# Patient Record
Sex: FEMALE | Race: WHITE | NOT HISPANIC OR LATINO | Employment: OTHER | URBAN - METROPOLITAN AREA
[De-identification: names, ages, dates, MRNs, and addresses within clinical notes are randomized per-mention and may not be internally consistent; named-entity substitution may affect disease eponyms.]

---

## 2017-09-14 ENCOUNTER — TRANSCRIBE ORDERS (OUTPATIENT)
Dept: ADMINISTRATIVE | Facility: HOSPITAL | Age: 62
End: 2017-09-14

## 2017-09-14 DIAGNOSIS — Z12.31 VISIT FOR SCREENING MAMMOGRAM: Primary | ICD-10-CM

## 2017-09-21 ENCOUNTER — OFFICE VISIT (OUTPATIENT)
Dept: OBSTETRICS AND GYNECOLOGY | Facility: CLINIC | Age: 62
End: 2017-09-21

## 2017-09-21 ENCOUNTER — HOSPITAL ENCOUNTER (OUTPATIENT)
Dept: BONE DENSITY | Facility: HOSPITAL | Age: 62
Discharge: HOME | End: 2017-09-21
Attending: OBSTETRICS & GYNECOLOGY | Admitting: OBSTETRICS & GYNECOLOGY

## 2017-09-21 VITALS
HEIGHT: 63 IN | DIASTOLIC BLOOD PRESSURE: 76 MMHG | WEIGHT: 229.8 LBS | BODY MASS INDEX: 40.72 KG/M2 | SYSTOLIC BLOOD PRESSURE: 120 MMHG

## 2017-09-21 DIAGNOSIS — Z78.0 MENOPAUSE: Primary | ICD-10-CM

## 2017-09-21 DIAGNOSIS — N95.2 VAGINAL ATROPHY: ICD-10-CM

## 2017-09-21 DIAGNOSIS — J45.41 MODERATE PERSISTENT ASTHMA WITH ACUTE EXACERBATION: ICD-10-CM

## 2017-09-21 DIAGNOSIS — Z78.0 MENOPAUSE: ICD-10-CM

## 2017-09-21 DIAGNOSIS — D25.2 SUBSEROSAL LEIOMYOMA OF UTERUS: ICD-10-CM

## 2017-09-21 DIAGNOSIS — N76.3 CHRONIC VULVITIS: ICD-10-CM

## 2017-09-21 DIAGNOSIS — J45.51 SEVERE PERSISTENT ASTHMA WITH ACUTE EXACERBATION: ICD-10-CM

## 2017-09-21 PROCEDURE — 99214 OFFICE O/P EST MOD 30 MIN: CPT | Performed by: OBSTETRICS & GYNECOLOGY

## 2017-09-21 PROCEDURE — 77080 DXA BONE DENSITY AXIAL: CPT

## 2017-09-21 RX ORDER — LISINOPRIL 10 MG/1
1 TABLET ORAL DAILY
COMMUNITY
Start: 2017-08-29 | End: 2017-10-03

## 2017-09-21 RX ORDER — ALBUTEROL SULFATE 90 UG/1
2 AEROSOL, METERED RESPIRATORY (INHALATION) AS NEEDED
COMMUNITY
Start: 2017-09-13 | End: 2019-01-31 | Stop reason: SDUPTHER

## 2017-09-21 RX ORDER — ESTRADIOL 0.1 MG/G
0.5 CREAM VAGINAL 2 TIMES WEEKLY
Qty: 42.5 G | Refills: 3 | Status: SHIPPED | OUTPATIENT
Start: 2017-09-21 | End: 2018-09-25 | Stop reason: ALTCHOICE

## 2017-09-21 RX ORDER — VENLAFAXINE HYDROCHLORIDE 150 MG/1
1 CAPSULE, EXTENDED RELEASE ORAL DAILY
COMMUNITY
Start: 2017-09-13 | End: 2021-10-11

## 2017-09-21 NOTE — PROGRESS NOTES
Chief Complaint   Patient presents with   • Gynecologic Exam     c/o vulvar itching   • Med Refill       Adore Brito is a 62 y.o. year old  presenting to be seen because of complaints of vaginal dryness, vulvar irritation, and marked increase in wheezing and shortness of breath.  She uses Estrace vaginal cream, 0.5 g twice a week.  She has developed irritation and itching of the vulva and groin area over the past few months.  She has used 1% hydrocortisone cream without relief.  She has noted an increase in cough, wheezing, and shortness of breath over the past year.  She has not been evaluated for this.  She has a history of uterine leiomyomata.  On a pelvic ultrasound from 2016 her uterus was 86 cm³.  She had 2 subserous and 2 intramural uterine leiomyomata present.  She is scheduled for a follow-up ultrasound today.    History   Sexual Activity   • Sexual activity: No     SCREENING TESTS    Year 2012   Age                         PAP                         HPV high risk                         Mammogram     benign                    MOSHE score                         Breast MRI                         Lipids                         Vitamin D                         Colonoscopy                         DEXA  Frax (hip/any)                         Ovarian Screen                             No Additional Complaints Reported    The following portions of the patient's history were reviewed and updated as appropriate:vital signs and   She  does not have any pertinent problems on file.  She  has a past surgical history that includes Tonsillectomy and Tracheostomy tube placement.  Her family history includes Deep vein thrombosis in her mother and sister; Diabetes in her mother; Hypertension in her father, maternal grandfather, maternal grandmother, mother, paternal grandfather, and paternal  "grandmother; Leukemia in her paternal grandfather; Stomach cancer in her maternal grandfather; Throat cancer in her father.  She  reports that she has never smoked. She has never used smokeless tobacco. She reports that she drinks alcohol. She reports that she does not use illicit drugs.  Current Outpatient Prescriptions   Medication Sig Dispense Refill   • BOOSTRIX 5-2.5-18.5 injection      • estradiol (ESTRACE) 0.1 MG/GM vaginal cream Insert 0.5 g into the vagina 2 (Two) Times a Week. 42.5 g 3   • hydrocortisone 2.5 % cream Apply a thin coat to the vulva daily 45 g 3   • lisinopril (PRINIVIL,ZESTRIL) 10 MG tablet Take 1 tablet by mouth Daily.     • temazepam (RESTORIL) 30 MG capsule Take 1 capsule by mouth as needed.     • venlafaxine XR (EFFEXOR-XR) 150 MG 24 hr capsule Take 1 capsule by mouth Daily.     • VENTOLIN  (90 Base) MCG/ACT inhaler Inhale 2 puffs As Needed.       No current facility-administered medications for this visit.      She is allergic to codeine..        Review of Systems  A comprehensive review of systems was done.  Constitutional: negative for fever, chills, activity change, appetite change, fatigue and unexpected weight change.  Respiratory: positive for cough, dyspnea on exertion and wheezing  Cardiovascular: negative  Gastrointestinal: negative  Genitourinary:negative  Musculoskeletal:positive for back pain and stiff joints  Behavioral/Psych: negative          /76  Ht 62.5\" (158.8 cm)  Wt 229 lb 12.8 oz (104 kg)  LMP  (LMP Unknown)  BMI 41.36 kg/m2    Physical Exam    General:  alert; cooperative; well developed; well nourished  obese - Body mass index is 41.36 kg/(m^2).   Skin:  No suspicious lesions seen   Thyroid: normal to inspection and palpation   Lungs:  wheezes bilaterally   Heart:  regular rate and rhythm, S1, S2 normal, no murmur, click, rub or gallop   Breasts:  Examined in supine position  Symmetric without masses or skin dimpling  Nipples normal without " inversion, lesions or discharge  There are no palpable axillary nodes   Abdomen: soft, non-tender; no masses  no umbilical or inginual hernias are present  no hepato-splenomegaly  obese   Pelvis: Clinical staff was present for exam  External genitalia:  erythema of the vulva and groin areas without lesions  Vaginal:  normal pink mucosa without prolapse or lesions.  Cervix:  normal appearance.  Uterus:  retroverted; asymmetrically enlarged, consistent with 6 weeks size;  Adnexa:  non palpable bilaterally.  Rectal:  anus visually normal appearing. recto-vaginal exam unremarkable and confirms findings;     Lab Review   No data reviewed    Imaging   Mammogram results- benign    Medical counseling was given to patient for the following topics: diagnostic results, instructions for management, risk factor reductions, impressions, risks and benefits of treatment options and importance of treatment compliance . Total time of the encounter was 28 minute(s) and 19 minute(s)  was spent in face to face counseling.  I have counseled this patient that she needs to continue using Estrace cream twice a week.  I have counseled the patient that she needs to use hydrocortisone cream, 2.5% to the vulva and groin areas daily to treat chronic vulvitis.  I counseled the patient that she needs follow-up for her asthma since her coughing and wheezing has significantly increased.  I counseled the patient that she needs a pelvic ultrasound to evaluate her irregular, enlarged uterus.  I have counseled the patient in monthly self breast assessment and annual breast imaging.  I have encouraged weight reduction and exercise.          ASSESSMENT  Problems Addressed this Visit        Genitourinary    Menopause - Primary    Relevant Orders    DEXA Bone Density Axial    Vaginal atrophy    Relevant Medications    estradiol (ESTRACE) 0.1 MG/GM vaginal cream       Other    Subserosal leiomyoma of uterus      Other Visit Diagnoses     Moderate persistent  asthma with acute exacerbation        Relevant Medications    VENTOLIN  (90 Base) MCG/ACT inhaler    Chronic vulvitis        Relevant Medications    hydrocortisone 2.5 % cream            PLAN    Medications prescribed this encounter:    New Medications Ordered This Visit   Medications   • lisinopril (PRINIVIL,ZESTRIL) 10 MG tablet     Sig: Take 1 tablet by mouth Daily.   • VENTOLIN  (90 Base) MCG/ACT inhaler     Sig: Inhale 2 puffs As Needed.   • BOOSTRIX 5-2.5-18.5 injection   • venlafaxine XR (EFFEXOR-XR) 150 MG 24 hr capsule     Sig: Take 1 capsule by mouth Daily.   • estradiol (ESTRACE) 0.1 MG/GM vaginal cream     Sig: Insert 0.5 g into the vagina 2 (Two) Times a Week.     Dispense:  42.5 g     Refill:  3   • hydrocortisone 2.5 % cream     Sig: Apply a thin coat to the vulva daily     Dispense:  45 g     Refill:  3   · Pelvic ultrasound scan-The pelvic ultrasound reveals 2 subserous and 2 intramural uterine leiomyomata which are stable.  The overall uterine size is stable.  The endometrial stripe is 3.2 mm.  The ovaries are not enlarged.  I have discussed these findings with the patient and she desires to be observed.  She is having no postmenopausal bleeding.  · Pulmonary consultation to evaluate Asthma and related symptoms  · Follow up: 12 month(s)  · Calcium, 600 mg/ Vit. D, 400 IU daily     *Please note that portions of this documentation may have been completed with a voice recognition program.  Efforts were made to edit this dictation, but occasional words may have been mistranscribed.     This note was electronically signed.    KISHA Melgar MD  September 21, 2017  11:13 AM

## 2017-09-21 NOTE — PATIENT INSTRUCTIONS
Calorie Counting for Weight Loss  Calories are energy you get from the things you eat and drink. Your body uses this energy to keep you going throughout the day. The number of calories you eat affects your weight. When you eat more calories than your body needs, your body stores the extra calories as fat. When you eat fewer calories than your body needs, your body burns fat to get the energy it needs.  Calorie counting means keeping track of how many calories you eat and drink each day. If you make sure to eat fewer calories than your body needs, you should lose weight. In order for calorie counting to work, you will need to eat the number of calories that are right for you in a day to lose a healthy amount of weight per week. A healthy amount of weight to lose per week is usually 1-2 lb (0.5-0.9 kg). A dietitian can determine how many calories you need in a day and give you suggestions on how to reach your calorie goal.   WHAT IS MY MY PLAN?  My goal is to have __________ calories per day.   If I have this many calories per day, I should lose around __________ pounds per week.  WHAT DO I NEED TO KNOW ABOUT CALORIE COUNTING?  In order to meet your daily calorie goal, you will need to:  · Find out how many calories are in each food you would like to eat. Try to do this before you eat.  · Decide how much of the food you can eat.  · Write down what you ate and how many calories it had. Doing this is called keeping a food log.  WHERE DO I FIND CALORIE INFORMATION?  The number of calories in a food can be found on a Nutrition Facts label. Note that all the information on a label is based on a specific serving of the food. If a food does not have a Nutrition Facts label, try to look up the calories online or ask your dietitian for help.  HOW DO I DECIDE HOW MUCH TO EAT?  To decide how much of the food you can eat, you will need to consider both the number of calories in one serving and the size of one serving. This  information can be found on the Nutrition Facts label. If a food does not have a Nutrition Facts label, look up the information online or ask your dietitian for help.  Remember that calories are listed per serving. If you choose to have more than one serving of a food, you will have to multiply the calories per serving by the amount of servings you plan to eat. For example, the label on a package of bread might say that a serving size is 1 slice and that there are 90 calories in a serving. If you eat 1 slice, you will have eaten 90 calories. If you eat 2 slices, you will have eaten 180 calories.  HOW DO I KEEP A FOOD LOG?  After each meal, record the following information in your food log:  · What you ate.  · How much of it you ate.  · How many calories it had.  · Then, add up your calories.  Keep your food log near you, such as in a small notebook in your pocket. Another option is to use a mobile jeremi or website. Some programs will calculate calories for you and show you how many calories you have left each time you add an item to the log.  WHAT ARE SOME CALORIE COUNTING TIPS?  · Use your calories on foods and drinks that will fill you up and not leave you hungry. Some examples of this include foods like nuts and nut butters, vegetables, lean proteins, and high-fiber foods (more than 5 g fiber per serving).  · Eat nutritious foods and avoid empty calories. Empty calories are calories you get from foods or beverages that do not have many nutrients, such as candy and soda. It is better to have a nutritious high-calorie food (such as an avocado) than a food with few nutrients (such as a bag of chips).  · Know how many calories are in the foods you eat most often. This way, you do not have to look up how many calories they have each time you eat them.  · Look out for foods that may seem like low-calorie foods but are really high-calorie foods, such as baked goods, soda, and fat-free candy.  · Pay attention to calories  in drinks. Drinks such as sodas, specialty coffee drinks, alcohol, and juices have a lot of calories yet do not fill you up. Choose low-calorie drinks like water and diet drinks.  · Focus your calorie counting efforts on higher calorie items. Logging the calories in a garden salad that contains only vegetables is less important than calculating the calories in a milk shake.  · Find a way of tracking calories that works for you. Get creative. Most people who are successful find ways to keep track of how much they eat in a day, even if they do not count every calorie.  WHAT ARE SOME PORTION CONTROL TIPS?  · Know how many calories are in a serving. This will help you know how many servings of a certain food you can have.  · Use a measuring cup to measure serving sizes. This is helpful when you start out. With time, you will be able to estimate serving sizes for some foods.  · Take some time to put servings of different foods on your favorite plates, bowls, and cups so you know what a serving looks like.  · Try not to eat straight from a bag or box. Doing this can lead to overeating. Put the amount you would like to eat in a cup or on a plate to make sure you are eating the right portion.  · Use smaller plates, glasses, and bowls to prevent overeating. This is a quick and easy way to practice portion control. If your plate is smaller, less food can fit on it.  · Try not to multitask while eating, such as watching TV or using your computer. If it is time to eat, sit down at a table and enjoy your food. Doing this will help you to start recognizing when you are full. It will also make you more aware of what and how much you are eating.  HOW CAN I CALORIE COUNT WHEN EATING OUT?  · Ask for smaller portion sizes or child-sized portions.  · Consider sharing an entree and sides instead of getting your own entree.  · If you get your own entree, eat only half. Ask for a box at the beginning of your meal and put the rest of your  "entree in it so you are not tempted to eat it.  · Look for the calories on the menu. If calories are listed, choose the lower calorie options.  · Choose dishes that include vegetables, fruits, whole grains, low-fat dairy products, and lean protein. Focusing on smart food choices from each of the 5 food groups can help you stay on track at restaurants.  · Choose items that are boiled, broiled, grilled, or steamed.  · Choose water, milk, unsweetened iced tea, or other drinks without added sugars. If you want an alcoholic beverage, choose a lower calorie option. For example, a regular jazmyne can have up to 700 calories and a glass of wine has around 150.  · Stay away from items that are buttered, battered, fried, or served with cream sauce. Items labeled \"crispy\" are usually fried, unless stated otherwise.  · Ask for dressings, sauces, and syrups on the side. These are usually very high in calories, so do not eat much of them.  · Watch out for salads. Many people think salads are a healthy option, but this is often not the case. Many salads come with bell, fried chicken, lots of cheese, fried chips, and dressing. All of these items have a lot of calories. If you want a salad, choose a garden salad and ask for grilled meats or steak. Ask for the dressing on the side, or ask for olive oil and vinegar or lemon to use as dressing.  · Estimate how many servings of a food you are given. For example, a serving of cooked rice is ½ cup or about the size of half a tennis ball or one cupcake wrapper. Knowing serving sizes will help you be aware of how much food you are eating at restaurants. The list below tells you how big or small some common portion sizes are based on everyday objects.    1 oz--4 stacked dice.    3 oz--1 deck of cards.    1 tsp--1 dice.    1 Tbsp--½ a Ping-Pong ball.    2 Tbsp--1 Ping-Pong ball.    ½ cup--1 tennis ball or 1 cupcake wrapper.    1 cup--1 baseball.     This information is not intended to " replace advice given to you by your health care provider. Make sure you discuss any questions you have with your health care provider.     Document Released: 12/18/2006 Document Revised: 01/08/2016 Document Reviewed: 10/23/2014  [employer] Interactive Patient Education ©2017 [employer]

## 2017-09-26 DIAGNOSIS — D39.10 NEOPLASM OF UNCERTAIN BEHAVIOR OF OVARY, UNSPECIFIED LATERALITY: ICD-10-CM

## 2017-09-26 DIAGNOSIS — N83.209 CYST OF OVARY, UNSPECIFIED LATERALITY: Primary | ICD-10-CM

## 2017-09-28 ENCOUNTER — LAB (OUTPATIENT)
Dept: LAB | Facility: HOSPITAL | Age: 62
End: 2017-09-28

## 2017-09-28 DIAGNOSIS — D39.10 NEOPLASM OF UNCERTAIN BEHAVIOR OF OVARY, UNSPECIFIED LATERALITY: ICD-10-CM

## 2017-09-28 LAB — CANCER AG125 SERPL QL: 16.2 U/ML (ref 0–30.2)

## 2017-09-28 PROCEDURE — 86304 IMMUNOASSAY TUMOR CA 125: CPT | Performed by: OBSTETRICS & GYNECOLOGY

## 2017-09-28 PROCEDURE — 36415 COLL VENOUS BLD VENIPUNCTURE: CPT

## 2017-10-03 ENCOUNTER — OFFICE VISIT (OUTPATIENT)
Dept: PULMONOLOGY | Facility: CLINIC | Age: 62
End: 2017-10-03

## 2017-10-03 VITALS
SYSTOLIC BLOOD PRESSURE: 122 MMHG | BODY MASS INDEX: 42.51 KG/M2 | HEIGHT: 62 IN | RESPIRATION RATE: 16 BRPM | OXYGEN SATURATION: 95 % | HEART RATE: 76 BPM | DIASTOLIC BLOOD PRESSURE: 77 MMHG | TEMPERATURE: 98 F | WEIGHT: 231 LBS

## 2017-10-03 DIAGNOSIS — J30.2 CHRONIC SEASONAL ALLERGIC RHINITIS, UNSPECIFIED TRIGGER: ICD-10-CM

## 2017-10-03 DIAGNOSIS — J45.40 MODERATE PERSISTENT ASTHMA WITHOUT COMPLICATION: ICD-10-CM

## 2017-10-03 DIAGNOSIS — K21.9 GASTROESOPHAGEAL REFLUX DISEASE, ESOPHAGITIS PRESENCE NOT SPECIFIED: ICD-10-CM

## 2017-10-03 DIAGNOSIS — R06.02 SHORTNESS OF BREATH: Primary | ICD-10-CM

## 2017-10-03 PROCEDURE — 94729 DIFFUSING CAPACITY: CPT | Performed by: INTERNAL MEDICINE

## 2017-10-03 PROCEDURE — 94726 PLETHYSMOGRAPHY LUNG VOLUMES: CPT | Performed by: INTERNAL MEDICINE

## 2017-10-03 PROCEDURE — 94375 RESPIRATORY FLOW VOLUME LOOP: CPT | Performed by: INTERNAL MEDICINE

## 2017-10-03 PROCEDURE — 99205 OFFICE O/P NEW HI 60 MIN: CPT | Performed by: INTERNAL MEDICINE

## 2017-10-03 RX ORDER — OMEPRAZOLE 40 MG/1
40 CAPSULE, DELAYED RELEASE ORAL DAILY
Qty: 30 CAPSULE | Refills: 3 | Status: SHIPPED | OUTPATIENT
Start: 2017-10-03

## 2017-10-03 RX ORDER — BUDESONIDE AND FORMOTEROL FUMARATE DIHYDRATE 160; 4.5 UG/1; UG/1
2 AEROSOL RESPIRATORY (INHALATION) 2 TIMES DAILY
Qty: 1 INHALER | Refills: 11 | Status: SHIPPED | OUTPATIENT
Start: 2017-10-03 | End: 2018-01-25 | Stop reason: SDUPTHER

## 2017-10-03 RX ORDER — FLUTICASONE PROPIONATE 50 MCG
2 SPRAY, SUSPENSION (ML) NASAL 2 TIMES DAILY
Qty: 1 BOTTLE | Refills: 11 | Status: SHIPPED | OUTPATIENT
Start: 2017-10-03 | End: 2017-10-26

## 2017-10-03 NOTE — PROGRESS NOTES
CC:    Asthma    HPI:    Ms. Brito is a 62-year-old white female with a lifetime history of asthma as well as questionable hypertension, depression/anxiety, lifetime nonsmoker, who was referred to me for evaluation of asthma.  As a child, when the patient was 5 years old, she had bronchitis, pneumonia, and croup, and actually required a tracheostomy.  She was subsequently cannulated.  For the past 2-3 years, patient has noticed increased chest tightness and wheezing, particularly in the summer and fall, when her allergies get worse, in addition to the spring.  She has had a dry cough which is worse at night.  She also complains of scratchy throat and acid reflux, again which is particularly worse at night.  She used to be on a PPI and has had an EGD in the past.  She has almost daily reflux for the past 10-15 years and takes Tums as needed.  She reports increased chest tightness and wheezing, particularly when going outside and trying to work in the yard.  Her symptoms are relieved by a rescue inhaler.  She is not currently on any controller medication and has never been on a controller medication.  Patient has no chest pain, hemoptysis, weight loss, night sweats, etc.                    PMH:    Asthma  Allergic Rhinitis  Past Medical History:   Diagnosis Date   • Anxiety    • Depression    • Fibroids     intramural, subserosal   • GERD (gastroesophageal reflux disease)    • History of panic attacks    • Hypertension    • Menopause      PSH:    Past Surgical History:   Procedure Laterality Date   • TONSILLECTOMY     • TRACHEOSTOMY       FH:    Family History   Problem Relation Age of Onset   • Hypertension Father    • Throat cancer Father    • Hypertension Mother    • Deep vein thrombosis Mother    • Diabetes Mother    • Deep vein thrombosis Sister    • Hypertension Paternal Grandfather    • Leukemia Paternal Grandfather    • Hypertension Paternal Grandmother    • Hypertension Maternal Grandmother    • Hypertension  Maternal Grandfather    • Stomach cancer Maternal Grandfather      SH:    Social History     Social History   • Marital status:      Spouse name: N/A   • Number of children: N/A   • Years of education: N/A     Occupational History   • Not on file.     Social History Main Topics   • Smoking status: Never Smoker   • Smokeless tobacco: Never Used   • Alcohol use Yes      Comment: rarely   • Drug use: No   • Sexual activity: No     Other Topics Concern   • Not on file     Social History Narrative     ALLERGIES:    Allergies   Allergen Reactions   • Codeine Nausea And Vomiting     MEDICATIONS:      Current Outpatient Prescriptions:   •  estradiol (ESTRACE) 0.1 MG/GM vaginal cream, Insert 0.5 g into the vagina 2 (Two) Times a Week., Disp: 42.5 g, Rfl: 3  •  hydrocortisone 2.5 % cream, Apply a thin coat to the vulva daily, Disp: 45 g, Rfl: 3  •  temazepam (RESTORIL) 30 MG capsule, Take 1 capsule by mouth as needed., Disp: , Rfl:   •  venlafaxine XR (EFFEXOR-XR) 150 MG 24 hr capsule, Take 1 capsule by mouth Daily., Disp: , Rfl:   •  VENTOLIN  (90 Base) MCG/ACT inhaler, Inhale 2 puffs As Needed., Disp: , Rfl:   ROS:  Per HPI, otherwise all systems reviewed and negative.    DIAGNOSTIC DATA (Reviewed and interpreted by me unless otherwise specified):    CXR 10/2/17 - no acute lung disease    PFT - 10/3/17 - mild restriction, low erv, normal DL that over-corrects c/w obesity (BMI 42)    Vitals:    10/03/17 1533   BP: 122/77   Pulse: 76   Resp: 16   Temp: 98 °F (36.7 °C)   SpO2: 95%       Physical Exam   Constitutional: Oriented to person, place, and time. Appears well-developed and well-nourished.   Head: Normocephalic and atraumatic.   Nose: Nose normal.   Mouth/Throat: Oropharynx is clear and moist.   Eyes: Conjunctivae are normal.   Neck: No tracheal deviation present.   Cardiovascular: Normal rate, regular rhythm, normal heart sounds and intact distal pulses.  Exam reveals no gallop and no friction rub.  No  thrill.  No JVD.  No edema.  No murmur heard.  Pulmonary/Chest: Effort normal and breath sounds w/ mild wheezing.  No tenderness to palpation.  No clubbing.   Abdominal: Soft. Bowel sounds are normal. No distension. No tenderness. There is no guarding.   Musculoskeletal: Normal range of motion.  No tenderness.  Lymphadenopathy:  No cervical adenopathy.   Neurological:  No new focal neurological deficits observed   Skin: Skin is warm and dry. No rash noted.   Psychiatric: Normal mood and affect.  Behavior is normal. Judgment normal.    Assessment/Plan     1)  Moderate Persistent Asthma - start symbicort 160 w/ spacer + prn albuterol.  If unable to afford it we can do pulmicort and formoterol neb bid.    2)  Allergic Rhinitis - flonase 2 sprays q nostril bid.  If not improved next visit we can step up therapy.    3)  GERD - probably contributing to asthma.  Start prilosec 20 daily.  If not improved, GI consult for possible EGD given long-standing symptoms.    RTC 6 weeks    David Danielle MD  Pulmonology and Critical Care Medicine  10/03/17 5:10 PM  Electronically Signed    C.C.:  Josesito Melgar MD, KISHA Melgar MD

## 2017-10-11 ENCOUNTER — HOSPITAL ENCOUNTER (OUTPATIENT)
Dept: MAMMOGRAPHY | Facility: HOSPITAL | Age: 62
Discharge: HOME | End: 2017-10-11
Attending: OBSTETRICS & GYNECOLOGY | Admitting: OBSTETRICS & GYNECOLOGY

## 2017-10-11 DIAGNOSIS — Z12.31 VISIT FOR SCREENING MAMMOGRAM: ICD-10-CM

## 2017-10-11 PROCEDURE — 77063 BREAST TOMOSYNTHESIS BI: CPT

## 2017-10-11 PROCEDURE — G0202 SCR MAMMO BI INCL CAD: HCPCS

## 2017-10-12 PROCEDURE — 77063 BREAST TOMOSYNTHESIS BI: CPT | Performed by: RADIOLOGY

## 2017-10-12 PROCEDURE — G0202 SCR MAMMO BI INCL CAD: HCPCS | Performed by: RADIOLOGY

## 2017-10-26 ENCOUNTER — OFFICE VISIT (OUTPATIENT)
Dept: PULMONOLOGY | Facility: CLINIC | Age: 62
End: 2017-10-26

## 2017-10-26 VITALS
TEMPERATURE: 97.6 F | HEIGHT: 62 IN | DIASTOLIC BLOOD PRESSURE: 74 MMHG | WEIGHT: 232 LBS | RESPIRATION RATE: 16 BRPM | HEART RATE: 83 BPM | SYSTOLIC BLOOD PRESSURE: 130 MMHG | BODY MASS INDEX: 42.69 KG/M2 | OXYGEN SATURATION: 96 %

## 2017-10-26 DIAGNOSIS — K21.9 GASTROESOPHAGEAL REFLUX DISEASE, ESOPHAGITIS PRESENCE NOT SPECIFIED: ICD-10-CM

## 2017-10-26 DIAGNOSIS — J45.40 MODERATE PERSISTENT ASTHMA WITHOUT COMPLICATION: Primary | ICD-10-CM

## 2017-10-26 PROCEDURE — 99213 OFFICE O/P EST LOW 20 MIN: CPT | Performed by: NURSE PRACTITIONER

## 2017-10-26 NOTE — PROGRESS NOTES
[facility] Pulmonary Follow up    CHIEF COMPLAINT    Asthma    HISTORY OF PRESENT ILLNESS    Adore Brito is a 62 y.o.female here today for follow-up on her moderate persistent asthma.    She was evaluated by Dr. David Danielle as a new patient on 10/3/17 for asthma.  At that time she was not on any maintenance medication and was experiencing increased chest tightness and wheezing.  Her symptoms were relieved by a rescue inhaler.  He started her on Symbicort 160 with a spacer.  She reports significant improvement in her breathing.  She denies shortness of breath or wheezing.  She has only had to use her Ventolin twice in the past 4 weeks.  Unfortunately she is unable to afford Symbicort and has been getting by on samples.  She also voiced concerns over being on an inhaled corticosteroid long-term.    She has a history of reflux with symptoms almost daily for the past 10-15 years, previously treated by [facility] as needed.  She was started on Prilosec and her symptoms have been well controlled since.    Dr. Danielle also prescribed Flonase for allergic rhinitis.  She states that she could not afford this but that she also does not have much in the way of nasal congestion or postnasal drainage anyway.        Patient Active Problem List   Diagnosis   • Menopause   • Hypertension   • History of panic attacks   • GERD (gastroesophageal reflux disease)   • Depression   • Anxiety   • Vaginal atrophy   • Leiomyoma, intramural   • Subserosal leiomyoma of uterus   • Ovarian cyst, left   • Moderate persistent asthma without complication   • Chronic seasonal allergic rhinitis       Allergies   Allergen Reactions   • Codeine Nausea And Vomiting       Current Outpatient Prescriptions:   •  budesonide-formoterol (SYMBICORT) 160-4.5 MCG/ACT inhaler, Inhale 2 puffs 2 (Two) Times a Day., Disp: 1 inhaler, Rfl: 11  •  estradiol (ESTRACE) 0.1 MG/GM vaginal cream, Insert 0.5 g into the vagina 2 (Two) Times a Week., Disp: 42.5 g, Rfl: 3  •   "hydrocortisone 2.5 % cream, Apply a thin coat to the vulva daily, Disp: 45 g, Rfl: 3  •  omeprazole (priLOSEC) 40 MG capsule, Take 1 capsule by mouth Daily., Disp: 30 capsule, Rfl: 3  •  temazepam (RESTORIL) 30 MG capsule, Take 1 capsule by mouth as needed., Disp: , Rfl:   •  venlafaxine XR (EFFEXOR-XR) 150 MG 24 hr capsule, Take 1 capsule by mouth Daily., Disp: , Rfl:   •  VENTOLIN  (90 Base) MCG/ACT inhaler, Inhale 2 puffs As Needed., Disp: , Rfl:   MEDICATION LIST AND ALLERGIES REVIEWED.    Social History   Substance Use Topics   • Smoking status: Never Smoker   • Smokeless tobacco: Never Used   • Alcohol use Yes      Comment: rarely       FAMILY AND SOCIAL HISTORY REVIEWED.    Review of Systems   Constitutional: Negative for chills, fatigue and fever.   HENT: Negative for congestion, postnasal drip, rhinorrhea and sore throat.    Respiratory: Negative for cough, chest tightness, shortness of breath and wheezing.    Cardiovascular: Negative for chest pain and leg swelling.   .    /74  Pulse 83  Temp 97.6 °F (36.4 °C)  Resp 16  Ht 62\" (157.5 cm)  Wt 232 lb (105 kg)  LMP  (LMP Unknown)  SpO2 96% Comment: RA  BMI 42.43 kg/m2    Physical Exam   Constitutional: She is oriented to person, place, and time. She appears well-developed. No distress.   HENT:   Head: Normocephalic.   Neck: Neck supple.   Cardiovascular: Normal rate, regular rhythm and normal heart sounds.  Exam reveals no friction rub.    No murmur heard.  Pulmonary/Chest: Effort normal and breath sounds normal. No stridor. No respiratory distress. She has no wheezes. She has no rales.   Abdominal: Soft.   Musculoskeletal: Normal range of motion.   Neurological: She is alert and oriented to person, place, and time.   Skin: Skin is warm and dry.   Psychiatric: She has a normal mood and affect. Her behavior is normal.   Vitals reviewed.        RESULTS    PFTS in the office today, read by me:    PROBLEM LIST    Problem List Items Addressed " This Visit        Respiratory    Moderate persistent asthma without complication - Primary       Digestive    GERD (gastroesophageal reflux disease)            DISCUSSION    Moderate persistent asthma- continue Symbicort 160. She was given an additional sample and encouraged to call for samples in the future.  We discussed the possibility of switching to nebulized pulmicort and formoterol, however she is still concerned about cost.  She also expressed concern over long-term inhaled corticosteroid use.  She had dramatic improvement on Symbicort so we also discussed possibly stepping down from a combo ICS+LABA to just a LABA. We will work with her to find an affordable option that keeps her symptoms well controlled.  She indicated that she plans to begin exercising in an attempt at weight loss.  I encouraged increasing her activity level and also advised her to have her Ventolin on hand in the event of any exercise-induced bronchospasms.    GERD- symptoms well controlled with Prilosec.  Continue use.    Follow-up in 3 months.    Mohini Begum, APRN  10/26/973579:07 AM  Electronically signed     Please note that portions of this note were completed with a voice recognition program. Efforts were made to edit the dictations, but occasionally words are mistranscribed.      CC: KISHA Melgar MD

## 2018-01-25 ENCOUNTER — TELEPHONE (OUTPATIENT)
Dept: PULMONOLOGY | Facility: CLINIC | Age: 63
End: 2018-01-25

## 2018-01-25 RX ORDER — BUDESONIDE AND FORMOTEROL FUMARATE DIHYDRATE 160; 4.5 UG/1; UG/1
2 AEROSOL RESPIRATORY (INHALATION)
Qty: 1 INHALER | Refills: 11 | COMMUNITY
Start: 2018-01-25 | End: 2020-10-13 | Stop reason: ALTCHOICE

## 2018-01-25 NOTE — TELEPHONE ENCOUNTER
Pt LVM requesting samples of Rx Symbicort and Ventolin. Pt has an upcoming apt scheduled on 1/30/18 @ 12:30. Spoke with pt and advised her that once she comes in that her samples(1-Symbicort) will be sitting up front but we do not have samples of Ventolin at this time. Pt verbalized understanding and appreciation.

## 2018-01-30 ENCOUNTER — OFFICE VISIT (OUTPATIENT)
Dept: PULMONOLOGY | Facility: CLINIC | Age: 63
End: 2018-01-30

## 2018-01-30 VITALS
HEART RATE: 77 BPM | WEIGHT: 236.25 LBS | HEIGHT: 62 IN | BODY MASS INDEX: 43.47 KG/M2 | OXYGEN SATURATION: 95 % | RESPIRATION RATE: 18 BRPM | DIASTOLIC BLOOD PRESSURE: 102 MMHG | TEMPERATURE: 98.7 F | SYSTOLIC BLOOD PRESSURE: 132 MMHG

## 2018-01-30 DIAGNOSIS — K21.9 GASTROESOPHAGEAL REFLUX DISEASE, ESOPHAGITIS PRESENCE NOT SPECIFIED: ICD-10-CM

## 2018-01-30 DIAGNOSIS — J45.40 MODERATE PERSISTENT ASTHMA WITHOUT COMPLICATION: Primary | ICD-10-CM

## 2018-01-30 PROCEDURE — 99213 OFFICE O/P EST LOW 20 MIN: CPT | Performed by: NURSE PRACTITIONER

## 2018-01-30 RX ORDER — ALBUTEROL SULFATE 0.63 MG/3ML
1 SOLUTION RESPIRATORY (INHALATION) EVERY 6 HOURS PRN
Qty: 60 VIAL | Refills: 1 | COMMUNITY
Start: 2018-01-30 | End: 2020-10-13

## 2018-07-30 ENCOUNTER — OFFICE VISIT (OUTPATIENT)
Dept: PULMONOLOGY | Facility: CLINIC | Age: 63
End: 2018-07-30

## 2018-07-30 VITALS
HEIGHT: 62 IN | OXYGEN SATURATION: 97 % | HEART RATE: 70 BPM | DIASTOLIC BLOOD PRESSURE: 92 MMHG | SYSTOLIC BLOOD PRESSURE: 144 MMHG | BODY MASS INDEX: 43.47 KG/M2 | TEMPERATURE: 98.2 F | WEIGHT: 236.2 LBS | RESPIRATION RATE: 16 BRPM

## 2018-07-30 DIAGNOSIS — K21.9 GASTROESOPHAGEAL REFLUX DISEASE, ESOPHAGITIS PRESENCE NOT SPECIFIED: ICD-10-CM

## 2018-07-30 DIAGNOSIS — J45.40 MODERATE PERSISTENT ASTHMA WITHOUT COMPLICATION: Primary | ICD-10-CM

## 2018-07-30 PROCEDURE — 99213 OFFICE O/P EST LOW 20 MIN: CPT | Performed by: NURSE PRACTITIONER

## 2018-07-30 RX ORDER — LISINOPRIL 5 MG/1
TABLET ORAL DAILY
COMMUNITY
Start: 2018-06-18 | End: 2020-11-09 | Stop reason: DRUGHIGH

## 2018-07-30 NOTE — PROGRESS NOTES
[facility] Pulmonary Follow up    CHIEF COMPLAINT    Asthma    HISTORY OF PRESENT ILLNESS    Adore Brito is a 63 y.o.female lifetime nonsmoker here today for follow-up on moderate persistent asthma.    I last saw her in January.  She has done well with no acute illnesses or exacerbations since then.    She relies on samples for her inhalers.  She uses either Symbicort or Breo based on availability.  She reports that as long as she uses either one of these, she rarely has to use her Ventolin.  When she runs out of her maintenance inhaler though she does have to use Ventolin several times per day.    We set her up with a nebulizer and nebulized albuterol at her last visit, as this was more cost effective than Ventolin.  She has not had to use this though.    She has a history of reflux but this has been well controlled with use of omeprazole.    She also has a reported history of allergic rhinitis.  However she is not on any treatment for this and denies much in the way of nasal congestion or post nasal drainage.    Patient Active Problem List   Diagnosis   • Menopause   • Hypertension   • History of panic attacks   • GERD (gastroesophageal reflux disease)   • Depression   • Anxiety   • Vaginal atrophy   • Leiomyoma, intramural   • Subserosal leiomyoma of uterus   • Ovarian cyst, left   • Moderate persistent asthma without complication   • Chronic seasonal allergic rhinitis       Allergies   Allergen Reactions   • Codeine Nausea And Vomiting       Current Outpatient Prescriptions:   •  albuterol (ACCUNEB) 0.63 MG/3ML nebulizer solution, Take 3 mL by nebulization Every 6 (Six) Hours As Needed for Wheezing., Disp: 60 vial, Rfl: 1  •  budesonide-formoterol (SYMBICORT) 160-4.5 MCG/ACT inhaler, Inhale 2 puffs 2 (Two) Times a Day., Disp: 1 inhaler, Rfl: 11  •  estradiol (ESTRACE) 0.1 MG/GM vaginal cream, Insert 0.5 g into the vagina 2 (Two) Times a Week., Disp: 42.5 g, Rfl: 3  •  Fluticasone Furoate-Vilanterol 100-25 MCG/INH  "aerosol powder , Inhale 1 puff Daily., Disp: 2 each, Rfl: 0  •  hydrocortisone 2.5 % cream, Apply a thin coat to the vulva daily, Disp: 45 g, Rfl: 3  •  lisinopril (PRINIVIL,ZESTRIL) 5 MG tablet, Daily., Disp: , Rfl:   •  omeprazole (priLOSEC) 40 MG capsule, Take 1 capsule by mouth Daily., Disp: 30 capsule, Rfl: 3  •  temazepam (RESTORIL) 30 MG capsule, Take 1 capsule by mouth as needed., Disp: , Rfl:   •  venlafaxine XR (EFFEXOR-XR) 150 MG 24 hr capsule, Take 1 capsule by mouth Daily., Disp: , Rfl:   •  VENTOLIN  (90 Base) MCG/ACT inhaler, Inhale 2 puffs As Needed., Disp: , Rfl:   MEDICATION LIST AND ALLERGIES REVIEWED.    Social History   Substance Use Topics   • Smoking status: Never Smoker   • Smokeless tobacco: Never Used   • Alcohol use Yes      Comment: rarely       FAMILY AND SOCIAL HISTORY REVIEWED.    Review of Systems   Constitutional: Negative for chills, fatigue and fever.   HENT: Negative for congestion, postnasal drip, rhinorrhea and sore throat.    Respiratory: Negative for cough, chest tightness, shortness of breath and wheezing.    Cardiovascular: Negative for chest pain and leg swelling.   .    /92   Pulse 70   Temp 98.2 °F (36.8 °C)   Resp 16   Ht 157.5 cm (62\")   Wt 107 kg (236 lb 3.2 oz)   LMP  (LMP Unknown)   SpO2 97% Comment: RA  BMI 43.20 kg/m²     Immunization History   Administered Date(s) Administered   • Flu Vaccine Quad PF >18YRS 11/16/2015   • Flu Vaccine Quad PF >36MO 09/14/2017       Physical Exam   Constitutional: She is oriented to person, place, and time. She appears well-developed. No distress.   HENT:   Head: Normocephalic.   Neck: Neck supple.   Cardiovascular: Normal rate, regular rhythm and normal heart sounds.  Exam reveals no friction rub.    No murmur heard.  Pulmonary/Chest: Effort normal and breath sounds normal. No stridor. No respiratory distress. She has no wheezes. She has no rales.   Abdominal: Soft.   Musculoskeletal: Normal range of motion. "   Neurological: She is alert and oriented to person, place, and time.   Skin: Skin is warm and dry.   Psychiatric: She has a normal mood and affect. Her behavior is normal.   Vitals reviewed.        RESULTS        PROBLEM LIST    Problem List Items Addressed This Visit        Respiratory    Moderate persistent asthma without complication - Primary       Digestive    GERD (gastroesophageal reflux disease)            DISCUSSION    Her asthma has been well controlled with either Symbicort or Breo.  She was provided with several samples of both as well as a savings card for 1 free month of Breo.  We discussed the possibility of stepping down her therapy to just an ICS however she would like to hold off since she notes her current inhalers work.  Also we have more samples available of ICS+LABA currently.     Her GERD has been well controlled since initiation of omeprazole.  She will continue this.    Follow-up in 6 months or sooner if needed.    I spent 15 minutes with the patient. I spent > 50% percent of this time counseling and discussing diagnosis, current status and treatment options.    Mohini Begum, ROCCO  07/30/201811:03 AM  Electronically signed     Please note that portions of this note were completed with a voice recognition program. Efforts were made to edit the dictations, but occasionally words are mistranscribed.      CC: KISHA Melgar MD

## 2018-09-25 ENCOUNTER — OFFICE VISIT (OUTPATIENT)
Dept: OBSTETRICS AND GYNECOLOGY | Facility: CLINIC | Age: 63
End: 2018-09-25

## 2018-09-25 VITALS
HEIGHT: 62 IN | SYSTOLIC BLOOD PRESSURE: 112 MMHG | DIASTOLIC BLOOD PRESSURE: 80 MMHG | WEIGHT: 237.6 LBS | BODY MASS INDEX: 43.72 KG/M2

## 2018-09-25 DIAGNOSIS — Z01.419 ENCOUNTER FOR GYNECOLOGICAL EXAMINATION WITHOUT ABNORMAL FINDING: ICD-10-CM

## 2018-09-25 DIAGNOSIS — N95.2 VAGINAL ATROPHY: ICD-10-CM

## 2018-09-25 DIAGNOSIS — N76.3 CHRONIC VULVITIS: ICD-10-CM

## 2018-09-25 DIAGNOSIS — Z78.0 MENOPAUSE: Primary | ICD-10-CM

## 2018-09-25 PROCEDURE — G0101 CA SCREEN;PELVIC/BREAST EXAM: HCPCS | Performed by: OBSTETRICS & GYNECOLOGY

## 2018-09-25 NOTE — PROGRESS NOTES
Chief Complaint   Patient presents with   • Gynecologic Exam   • Med Refill     patient unable to afford estrace VC       Adore Brito is a 63 y.o. year old  presenting to be seen for her annual exam.  This patient is menopausal and does not use systemic estrogen/progestin therapy.  She has a history of 2 subserous and 2 intramural uterine leiomyomata which are asymptomatic.  She has a history of vaginal atrophy and has been using estradiol cream, 0.5 g twice a week.  She has a history of chronic vulvitis and uses hydrocortisone cream, 2.5% to the vulva as needed.  She denies bowel or urinary symptoms.    SCREENING TESTS    Year 2012   Age                         PAP                         HPV high risk                         Mammogram      benign                   MOSHE score                         Breast MRI                         Lipids                         Vitamin D                         Colonoscopy                         DEXA  Frax (hip/any)      normal                   Ovarian Screen      normal                       She exercises regularly: no.  She wears her seat belt: yes.  She has concerns about domestic violence: no.  She has noticed changes in height: no    GYN screening history:  · Last mammogram: was done on approximately 10/11/2017 and the result was: Birads I (Normal).  · Last DEXA: was done on approximately 2017 and the results were: normal.    No Additional Complaints Reported    The following portions of the patient's history were reviewed and updated as appropriate:vital signs and   She  has a past medical history of Anxiety; Depression; Fibroids; GERD (gastroesophageal reflux disease); History of panic attacks; Hypertension; and Menopause.  She  does not have any pertinent problems on file.  She  has a past surgical history that includes Tonsillectomy and Tracheostomy  "tube placement.  Her family history includes Deep vein thrombosis in her mother and sister; Diabetes in her mother; Hypertension in her father, maternal grandfather, maternal grandmother, mother, paternal grandfather, and paternal grandmother; Leukemia in her paternal grandfather; Stomach cancer in her maternal grandfather; Throat cancer in her father.  She  reports that she has never smoked. She has never used smokeless tobacco. She reports that she drinks alcohol. She reports that she does not use drugs.  Current Outpatient Prescriptions   Medication Sig Dispense Refill   • albuterol (ACCUNEB) 0.63 MG/3ML nebulizer solution Take 3 mL by nebulization Every 6 (Six) Hours As Needed for Wheezing. 60 vial 1   • budesonide-formoterol (SYMBICORT) 160-4.5 MCG/ACT inhaler Inhale 2 puffs 2 (Two) Times a Day. 1 inhaler 11   • Fluticasone Furoate-Vilanterol 100-25 MCG/INH aerosol powder  Inhale 1 puff Daily. 2 each 0   • hydrocortisone 2.5 % cream Apply by topical route to the vulva as needed 45 g 2   • lisinopril (PRINIVIL,ZESTRIL) 5 MG tablet Daily.     • omeprazole (priLOSEC) 40 MG capsule Take 1 capsule by mouth Daily. 30 capsule 3   • temazepam (RESTORIL) 30 MG capsule Take 1 capsule by mouth as needed.     • venlafaxine XR (EFFEXOR-XR) 150 MG 24 hr capsule Take 1 capsule by mouth Daily.     • VENTOLIN  (90 Base) MCG/ACT inhaler Inhale 2 puffs As Needed.       No current facility-administered medications for this visit.      She is allergic to codeine..    Review of Systems  A comprehensive review of systems was taken.  Constitutional: negative for fever, chills, activity change, appetite change, fatigue and unexpected weight change.  Respiratory: negative  Cardiovascular: negative  Gastrointestinal: negative  Genitourinary:negative  Musculoskeletal:negative  Behavioral/Psych: negative       /80   Ht 157.5 cm (62\")   Wt 108 kg (237 lb 9.6 oz)   LMP  (LMP Unknown)   BMI 43.46 kg/m²     Physical " Exam    General:  alert; cooperative; well developed; well nourished  obese - Body mass index is 43.46 kg/m².   Skin:  No suspicious lesions seen   Thyroid: normal to inspection and palpation   Lungs:  clear to auscultation bilaterally   Heart:  regular rate and rhythm, S1, S2 normal, no murmur, click, rub or gallop   Breasts:  Examined in supine position  Symmetric without masses or skin dimpling  Nipples normal without inversion, lesions or discharge  There are no palpable axillary nodes   Abdomen: soft, non-tender; no masses  no umbilical or inginual hernias are present  no hepato-splenomegaly   Pelvis: Clinical staff was present for exam  External genitalia:  normal appearance of the external genitalia including Bartholin's and Fincastle's glands.  Vaginal:  atrophic mucosal changes are present;  Cervix:  normal appearance.  Uterus:  normal size, shape and consistency. anteverted;  Adnexa:  non palpable bilaterally.  Rectal:  anus visually normal appearing. recto-vaginal exam unremarkable and confirms findings;     Lab Review   No data reviewed    Imaging  Mammogram results- Birads I, and DEXA- normal         ASSESSMENT  Problems Addressed this Visit        Genitourinary    Menopause - Primary    Vaginal atrophy      Other Visit Diagnoses     Chronic vulvitis        Relevant Medications    hydrocortisone 2.5 % cream    Encounter for gynecological examination without abnormal finding        Relevant Orders    Liquid-based Pap Smear, Screening          PLAN    Medications prescribed this encounter:    New Medications Ordered This Visit   Medications   • hydrocortisone 2.5 % cream     Sig: Apply by topical route to the vulva as needed     Dispense:  45 g     Refill:  2   · Estradiol cream, 0.1 mg/g to be used in a dose of 0.5 g intravaginally twice a week  · Monthly self breast assessment and annual breast imaging  · Calcium, 600 mg/ Vit. D, 400 IU daily; regular weight-bearing exercise  · Follow up: 12  month(s)  *Please note that portions of this documentation may have been completed with a voice recognition program.  Efforts were made to edit this dictation, but occasional words may have been mistranscribed.       This note was electronically signed.    KISHA Melgar MD  September 25, 2018  11:02 AM

## 2018-11-28 ENCOUNTER — TRANSCRIBE ORDERS (OUTPATIENT)
Dept: ADMINISTRATIVE | Facility: HOSPITAL | Age: 63
End: 2018-11-28

## 2018-11-28 DIAGNOSIS — Z12.31 VISIT FOR SCREENING MAMMOGRAM: Primary | ICD-10-CM

## 2019-01-17 ENCOUNTER — APPOINTMENT (OUTPATIENT)
Dept: MAMMOGRAPHY | Facility: HOSPITAL | Age: 64
End: 2019-01-17
Attending: OBSTETRICS & GYNECOLOGY

## 2019-01-23 ENCOUNTER — TELEPHONE (OUTPATIENT)
Dept: PULMONOLOGY | Facility: CLINIC | Age: 64
End: 2019-01-23

## 2019-01-23 NOTE — TELEPHONE ENCOUNTER
Pt called and stated that she has been having a cough going on for 1 week along with yellowish sputum and [facility]. Pt denies any fever,  But very congested. Pt has an upcoming apt on 1/31. Please advise.

## 2019-01-24 RX ORDER — AZITHROMYCIN 250 MG/1
TABLET, FILM COATED ORAL
Qty: 6 TABLET | Refills: 0 | Status: SHIPPED | OUTPATIENT
Start: 2019-01-24 | End: 2019-01-31

## 2019-01-31 ENCOUNTER — OFFICE VISIT (OUTPATIENT)
Dept: PULMONOLOGY | Facility: CLINIC | Age: 64
End: 2019-01-31

## 2019-01-31 VITALS
WEIGHT: 239.25 LBS | OXYGEN SATURATION: 97 % | TEMPERATURE: 97.7 F | BODY MASS INDEX: 42.39 KG/M2 | HEART RATE: 70 BPM | RESPIRATION RATE: 18 BRPM | HEIGHT: 63 IN | DIASTOLIC BLOOD PRESSURE: 70 MMHG | SYSTOLIC BLOOD PRESSURE: 126 MMHG

## 2019-01-31 DIAGNOSIS — B37.0 ORAL THRUSH: ICD-10-CM

## 2019-01-31 DIAGNOSIS — J45.40 MODERATE PERSISTENT ASTHMA WITHOUT COMPLICATION: Primary | ICD-10-CM

## 2019-01-31 DIAGNOSIS — K21.9 GASTROESOPHAGEAL REFLUX DISEASE, ESOPHAGITIS PRESENCE NOT SPECIFIED: ICD-10-CM

## 2019-01-31 DIAGNOSIS — J30.2 CHRONIC SEASONAL ALLERGIC RHINITIS: ICD-10-CM

## 2019-01-31 DIAGNOSIS — E66.01 MORBIDLY OBESE (HCC): ICD-10-CM

## 2019-01-31 PROCEDURE — 99213 OFFICE O/P EST LOW 20 MIN: CPT | Performed by: NURSE PRACTITIONER

## 2019-01-31 RX ORDER — FLUCONAZOLE 200 MG/1
200 TABLET ORAL DAILY
Qty: 1 TABLET | Refills: 0 | Status: SHIPPED | OUTPATIENT
Start: 2019-01-31 | End: 2020-10-01

## 2019-01-31 RX ORDER — ALBUTEROL SULFATE 90 UG/1
2 AEROSOL, METERED RESPIRATORY (INHALATION) EVERY 6 HOURS PRN
Qty: 1 INHALER | Refills: 3 | Status: SHIPPED | OUTPATIENT
Start: 2019-01-31 | End: 2020-10-13 | Stop reason: SDUPTHER

## 2019-01-31 NOTE — PROGRESS NOTES
[facility] Follow up    CHIEF COMPLAINT    Asthma follow up    HISTORY OF PRESENT ILLNESS    Adore Brito is a 63 y.o.female here today for routine follow-up.  She's been followed in our office by Mrs. Begum for moderate persistent asthma.  She currently takes Symbicort or Breo, depending on samples due to cost of her medications.  She did have an episode of acute bronchitis a few weeks ago and completed a course of antibiotics.  She does not use oral steroids secondary to side effects.  She still has a bit of a sore throat as well as some postnasal drainage.  Her cough and sputum production is gone.  Chief occasionally uses her albuterol inhaler.  She does have some nebulizers at home as well.    She denies any worsening reflux symptoms.  No episodes of dysphagia or choking.      Patient Active Problem List   Diagnosis   • Menopause   • Hypertension   • History of panic attacks   • GERD (gastroesophageal reflux disease)   • Depression   • Anxiety   • Vaginal atrophy   • Leiomyoma, intramural   • Subserosal leiomyoma of uterus   • Ovarian cyst, left   • Moderate persistent asthma without complication   • Chronic seasonal allergic rhinitis   • Fibroids   • Morbidly obese (CMS/[facility])       Allergies   Allergen Reactions   • Codeine Nausea And Vomiting       Current Outpatient Medications:   •  albuterol (ACCUNEB) 0.63 MG/3ML nebulizer solution, Take 3 mL by nebulization Every 6 (Six) Hours As Needed for Wheezing., Disp: 60 vial, Rfl: 1  •  budesonide-formoterol (SYMBICORT) 160-4.5 MCG/ACT inhaler, Inhale 2 puffs 2 (Two) Times a Day., Disp: 1 inhaler, Rfl: 11  •  Fluticasone Furoate-Vilanterol 100-25 MCG/INH aerosol powder , Inhale 1 puff Daily., Disp: 2 each, Rfl: 0  •  hydrocortisone 2.5 % cream, Apply by topical route to the vulva as needed, Disp: 45 g, Rfl: 2  •  lisinopril (PRINIVIL,ZESTRIL) 5 MG tablet, Daily., Disp: , Rfl:   •  omeprazole (priLOSEC) 40 MG capsule, Take 1 capsule by mouth Daily., Disp: 30  "capsule, Rfl: 3  •  temazepam (RESTORIL) 30 MG capsule, Take 1 capsule by mouth as needed., Disp: , Rfl:   •  venlafaxine XR (EFFEXOR-XR) 150 MG 24 hr capsule, Take 1 capsule by mouth Daily., Disp: , Rfl:   •  VENTOLIN  (90 Base) MCG/ACT inhaler, Inhale 2 puffs Every 6 (Six) Hours As Needed for Wheezing., Disp: 1 inhaler, Rfl: 3  •  fluconazole (DIFLUCAN) 200 MG tablet, Take 1 tablet by mouth Daily., Disp: 1 tablet, Rfl: 0  MEDICATION LIST AND ALLERGIES REVIEWED.    Social History     Tobacco Use   • Smoking status: Never Smoker   • Smokeless tobacco: Never Used   Substance Use Topics   • Alcohol use: Yes     Comment: rarely   • Drug use: No       FAMILY AND SOCIAL HISTORY REVIEWED.    Review of Systems   Constitutional: Positive for fatigue. Negative for chills, fever and unexpected weight change.   HENT: Positive for congestion, postnasal drip, rhinorrhea and sore throat. Negative for nosebleeds, sinus pressure and trouble swallowing.    Respiratory: Positive for shortness of breath and wheezing. Negative for cough and chest tightness.    Cardiovascular: Negative for chest pain and leg swelling.   Gastrointestinal: Negative for abdominal pain, constipation, diarrhea, nausea and vomiting.   Genitourinary: Negative for dysuria, frequency, hematuria and urgency.   Musculoskeletal: Negative for myalgias.   Neurological: Negative for dizziness, weakness, numbness and headaches.   All other systems reviewed and are negative.  .    /70 (BP Location: Right arm, Patient Position: Sitting, Cuff Size: Adult)   Pulse 70   Temp 97.7 °F (36.5 °C)   Resp 18   Ht 160 cm (63\")   Wt 109 kg (239 lb 4 oz)   LMP  (LMP Unknown)   SpO2 97% Comment: room air at rest  BMI 42.38 kg/m²     Immunization History   Administered Date(s) Administered   • Flu Vaccine Quad PF >18YRS 11/16/2015   • Flu Vaccine Quad PF >36MO 09/14/2017       Physical Exam   Constitutional: She is oriented to person, place, and time. She appears " well-developed and well-nourished.   HENT:   Head: Normocephalic and atraumatic.   Mouth/Throat: Posterior oropharyngeal erythema present. Oropharyngeal exudate: scant white patches.   Eyes: EOM are normal. Pupils are equal, round, and reactive to light.   Neck: Normal range of motion. Neck supple.   Cardiovascular: Normal rate and regular rhythm.   No murmur heard.  Pulmonary/Chest: Effort normal and breath sounds normal. No respiratory distress. She has no wheezes. She has no rales.   Abdominal: Soft. Bowel sounds are normal. She exhibits no distension.   Musculoskeletal: Normal range of motion. She exhibits no edema.   Neurological: She is alert and oriented to person, place, and time.   Skin: Skin is warm and dry. No erythema.   Psychiatric: She has a normal mood and affect. Her behavior is normal.   Vitals reviewed.        RESULTS        PROBLEM LIST    Problem List Items Addressed This Visit        Respiratory    Moderate persistent asthma without complication - Primary    Relevant Medications    VENTOLIN  (90 Base) MCG/ACT inhaler    Chronic seasonal allergic rhinitis       Digestive    GERD (gastroesophageal reflux disease)    Morbidly obese (CMS/HCC)      Other Visit Diagnoses     Oral thrush        Relevant Medications    fluconazole (DIFLUCAN) 200 MG tablet            DISCUSSION    Continue on her breo, but does seem to help the best.  I gave her some samples in the office today.    Continue on her PPI for her reflux.  As well as reflux precautions.    I did give her a dose of Diflucan for some oral thrush from her ICS as well as recent antibiotic use.    Follow-up in 6 months with full PFTs and chest x-ray.    I spent 15 minutes with the patient. I spent > 50% percent of this time counseling and discussing evaluation, current status and management.    Karli Thomas, ROCCO  01/31/201911:33 AM  Electronically signed     Please note that portions of this note were completed with a voice recognition  program. Efforts were made to edit the dictations, but occasionally words are mistranscribed.      CC: Josesito Melgar MD

## 2019-03-01 ENCOUNTER — HOSPITAL ENCOUNTER (OUTPATIENT)
Dept: MAMMOGRAPHY | Facility: HOSPITAL | Age: 64
Discharge: HOME | End: 2019-03-01
Attending: OBSTETRICS & GYNECOLOGY | Admitting: OBSTETRICS & GYNECOLOGY

## 2019-03-01 DIAGNOSIS — Z12.31 VISIT FOR SCREENING MAMMOGRAM: ICD-10-CM

## 2019-03-01 PROCEDURE — 77063 BREAST TOMOSYNTHESIS BI: CPT

## 2019-03-01 PROCEDURE — 77067 SCR MAMMO BI INCL CAD: CPT | Performed by: RADIOLOGY

## 2019-03-01 PROCEDURE — 77063 BREAST TOMOSYNTHESIS BI: CPT | Performed by: RADIOLOGY

## 2019-03-01 PROCEDURE — 77067 SCR MAMMO BI INCL CAD: CPT

## 2019-05-06 ENCOUNTER — TELEPHONE (OUTPATIENT)
Dept: OBSTETRICS AND GYNECOLOGY | Facility: CLINIC | Age: 64
End: 2019-05-06

## 2019-05-06 NOTE — TELEPHONE ENCOUNTER
Patient requested assistance from [employer] regarding her prescription for Premarin Vaginal Cream.  I completed the paper work and the request was granted.  The company mailed the Premarin cream to our office.  I called the patient today to let her know that she could pick it up at her convenience.  She did not answer, but I left a voicemail.

## 2019-07-31 ENCOUNTER — OFFICE VISIT (OUTPATIENT)
Dept: PULMONOLOGY | Facility: CLINIC | Age: 64
End: 2019-07-31

## 2019-07-31 VITALS
OXYGEN SATURATION: 94 % | DIASTOLIC BLOOD PRESSURE: 78 MMHG | HEIGHT: 63 IN | SYSTOLIC BLOOD PRESSURE: 136 MMHG | TEMPERATURE: 98.6 F | WEIGHT: 236 LBS | HEART RATE: 80 BPM | BODY MASS INDEX: 41.82 KG/M2

## 2019-07-31 DIAGNOSIS — J30.2 CHRONIC SEASONAL ALLERGIC RHINITIS: ICD-10-CM

## 2019-07-31 DIAGNOSIS — J45.40 MODERATE PERSISTENT ASTHMA WITHOUT COMPLICATION: Primary | ICD-10-CM

## 2019-07-31 DIAGNOSIS — K21.9 GASTROESOPHAGEAL REFLUX DISEASE, ESOPHAGITIS PRESENCE NOT SPECIFIED: ICD-10-CM

## 2019-07-31 PROCEDURE — 94375 RESPIRATORY FLOW VOLUME LOOP: CPT | Performed by: NURSE PRACTITIONER

## 2019-07-31 PROCEDURE — 94726 PLETHYSMOGRAPHY LUNG VOLUMES: CPT | Performed by: NURSE PRACTITIONER

## 2019-07-31 PROCEDURE — 99213 OFFICE O/P EST LOW 20 MIN: CPT | Performed by: NURSE PRACTITIONER

## 2019-07-31 PROCEDURE — 94729 DIFFUSING CAPACITY: CPT | Performed by: NURSE PRACTITIONER

## 2019-07-31 NOTE — PROGRESS NOTES
"[facility] Pulmonary Follow up    CHIEF COMPLAINT    asthma follow up     Subjective   HISTORY OF PRESENT ILLNESS    Adore Brito is a 64 y.o.female here today for routine six-month follow-up on her moderate persistent asthma.  She is actually done \"very good\" the last year.  She is in no acute exacerbation or illnesses.  She continues to use her Symbicort daily to help with symptom management.  She has no rescue inhaler use.  She uses an antihistamine as needed.      Patient Active Problem List   Diagnosis   • Menopause   • Hypertension   • History of panic attacks   • GERD (gastroesophageal reflux disease)   • Depression   • Anxiety   • Vaginal atrophy   • Leiomyoma, intramural   • Subserosal leiomyoma of uterus   • Ovarian cyst, left   • Moderate persistent asthma without complication   • Chronic seasonal allergic rhinitis   • Fibroids   • Morbidly obese (CMS/HCC)       Allergies   Allergen Reactions   • Codeine Nausea And Vomiting       Current Outpatient Medications:   •  albuterol (ACCUNEB) 0.63 MG/3ML nebulizer solution, Take 3 mL by nebulization Every 6 (Six) Hours As Needed for Wheezing., Disp: 60 vial, Rfl: 1  •  Fluticasone Furoate-Vilanterol 100-25 MCG/INH aerosol powder , Inhale 1 puff Daily., Disp: 2 each, Rfl: 0  •  lisinopril (PRINIVIL,ZESTRIL) 5 MG tablet, Daily., Disp: , Rfl:   •  omeprazole (priLOSEC) 40 MG capsule, Take 1 capsule by mouth Daily., Disp: 30 capsule, Rfl: 3  •  temazepam (RESTORIL) 30 MG capsule, Take 1 capsule by mouth as needed., Disp: , Rfl:   •  venlafaxine XR (EFFEXOR-XR) 150 MG 24 hr capsule, Take 1 capsule by mouth Daily., Disp: , Rfl:   •  VENTOLIN  (90 Base) MCG/ACT inhaler, Inhale 2 puffs Every 6 (Six) Hours As Needed for Wheezing., Disp: 1 inhaler, Rfl: 3  •  budesonide-formoterol (SYMBICORT) 160-4.5 MCG/ACT inhaler, Inhale 2 puffs 2 (Two) Times a Day., Disp: 1 inhaler, Rfl: 11  •  fluconazole (DIFLUCAN) 200 MG tablet, Take 1 tablet by mouth Daily., Disp: 1 tablet, " "Rfl: 0  •  hydrocortisone 2.5 % cream, Apply by topical route to the vulva as needed, Disp: 45 g, Rfl: 2  MEDICATION LIST AND ALLERGIES REVIEWED.    Social History     Tobacco Use   • Smoking status: Never Smoker   • Smokeless tobacco: Never Used   Substance Use Topics   • Alcohol use: Yes     Comment: rarely   • Drug use: No       FAMILY AND SOCIAL HISTORY REVIEWED.    Review of Systems   Constitutional: Negative for chills, fatigue, fever and unexpected weight change.   HENT: Negative for congestion, nosebleeds, postnasal drip, rhinorrhea, sinus pressure and trouble swallowing.    Respiratory: Negative for cough, chest tightness, shortness of breath and wheezing.    Cardiovascular: Negative for chest pain and leg swelling.   Gastrointestinal: Negative for abdominal pain, constipation, diarrhea, nausea and vomiting.   Genitourinary: Negative for dysuria, frequency, hematuria and urgency.   Musculoskeletal: Negative for myalgias.   Neurological: Negative for dizziness, weakness, numbness and headaches.   All other systems reviewed and are negative.  .  Objective   /78   Pulse 80   Temp 98.6 °F (37 °C)   Ht 160 cm (63\")   Wt 107 kg (236 lb)   LMP  (LMP Unknown)   SpO2 94% Comment: room air at rest  BMI 41.81 kg/m²     Immunization History   Administered Date(s) Administered   • Flu Vaccine Quad PF >18YRS 11/16/2015   • Flu Vaccine Quad PF >36MO 09/14/2017       Physical Exam   Constitutional: She is oriented to person, place, and time. She appears well-developed and well-nourished.   HENT:   Head: Normocephalic and atraumatic.   Eyes: EOM are normal. Pupils are equal, round, and reactive to light.   Neck: Normal range of motion. Neck supple.   Cardiovascular: Normal rate and regular rhythm.   No murmur heard.  Pulmonary/Chest: Effort normal and breath sounds normal. No respiratory distress. She has no wheezes. She has no rales.   Abdominal: Soft. Bowel sounds are normal. She exhibits no distension. "   Musculoskeletal: Normal range of motion. She exhibits no edema.   Neurological: She is alert and oriented to person, place, and time.   Skin: Skin is warm and dry. No erythema.   Psychiatric: She has a normal mood and affect. Her behavior is normal.   Vitals reviewed.        RESULTS    PFTS in the office today, read by me:  Normal PFTs.  No obstruction or restriction normal total lung capacity 3.65 L 82%  Normal diffusion      Assessment/Plan     PROBLEM LIST    Problem List Items Addressed This Visit        Respiratory    Moderate persistent asthma without complication - Primary    Relevant Orders    Pulmonary Function Test (Completed)    Chronic seasonal allergic rhinitis       Digestive    GERD (gastroesophageal reflux disease)            DISCUSSION    Currently her mild intermittent asthma is well controlled on the Symbicort.  Her PFTs look great.  Follow up annually or as needed.     I spent 15 minutes with the patient. I spent > 50% percent of this time counseling and discussing diagnostic testing, evaluation, current status and management.    Karli Thomas, APRN  07/31/20199:29 AM  Electronically signed     Please note that portions of this note were completed with a voice recognition program. Efforts were made to edit the dictations, but occasionally words are mistranscribed.      CC: Josesito Melgar MD

## 2019-09-26 ENCOUNTER — LAB (OUTPATIENT)
Dept: LAB | Facility: HOSPITAL | Age: 64
End: 2019-09-26

## 2019-09-26 ENCOUNTER — OFFICE VISIT (OUTPATIENT)
Dept: OBSTETRICS AND GYNECOLOGY | Facility: CLINIC | Age: 64
End: 2019-09-26

## 2019-09-26 VITALS
HEIGHT: 63 IN | BODY MASS INDEX: 41.85 KG/M2 | WEIGHT: 236.2 LBS | DIASTOLIC BLOOD PRESSURE: 86 MMHG | SYSTOLIC BLOOD PRESSURE: 146 MMHG

## 2019-09-26 DIAGNOSIS — D25.2 SUBSEROSAL LEIOMYOMA OF UTERUS: ICD-10-CM

## 2019-09-26 DIAGNOSIS — R35.0 URINARY FREQUENCY: ICD-10-CM

## 2019-09-26 DIAGNOSIS — Z78.0 MENOPAUSE: Primary | ICD-10-CM

## 2019-09-26 DIAGNOSIS — D39.12 NEOPLASM OF UNCERTAIN BEHAVIOR OF LEFT OVARY: ICD-10-CM

## 2019-09-26 DIAGNOSIS — D25.1 LEIOMYOMA, INTRAMURAL: ICD-10-CM

## 2019-09-26 DIAGNOSIS — N95.2 VAGINAL ATROPHY: ICD-10-CM

## 2019-09-26 LAB
BILIRUB UR QL STRIP: NEGATIVE
CANCER AG125 SERPL QL: 17.8 U/ML (ref 0–38.1)
CLARITY UR: CLEAR
COLOR UR: YELLOW
GLUCOSE UR STRIP-MCNC: NEGATIVE MG/DL
HGB UR QL STRIP.AUTO: NEGATIVE
KETONES UR QL STRIP: NEGATIVE
LEUKOCYTE ESTERASE UR QL STRIP.AUTO: NEGATIVE
NITRITE UR QL STRIP: NEGATIVE
PH UR STRIP.AUTO: 8 [PH] (ref 5–8)
PROT UR QL STRIP: NEGATIVE
SP GR UR STRIP: 1.01 (ref 1–1.03)
UROBILINOGEN UR QL STRIP: NORMAL

## 2019-09-26 PROCEDURE — 99213 OFFICE O/P EST LOW 20 MIN: CPT | Performed by: OBSTETRICS & GYNECOLOGY

## 2019-09-26 PROCEDURE — 36415 COLL VENOUS BLD VENIPUNCTURE: CPT

## 2019-09-26 PROCEDURE — 86304 IMMUNOASSAY TUMOR CA 125: CPT

## 2019-09-26 PROCEDURE — 81003 URINALYSIS AUTO W/O SCOPE: CPT

## 2019-09-26 NOTE — PROGRESS NOTES
"   Chief Complaint   Patient presents with   • Gynecologic Exam   • Urinary Frequency     \"aching\"       Adore Brito is a 64 y.o. year old  presenting to be seen because of follow-up for menopause, vaginal atrophy, uterine leiomyomata, and a solid left ovary.  Her CA-125 is normal.  She denies abdominal bloating or pressure.  She denies bowel symptoms.  She has no postmenopausal bleeding.  She has complaints of urinary frequency and urgency.    Social History     Substance and Sexual Activity   Sexual Activity No     SCREENING TESTS    Year 2012   Age                         PAP                         HPV high risk                         Mammogram       benign benign                 MOSHE score                         Breast MRI                         Lipids                         Vitamin D                         Colonoscopy                         DEXA  Frax (hip/any)      normal                   Ovarian Screen                             No Additional Complaints Reported    The following portions of the patient's history were reviewed and updated as appropriate:vital signs and   She  has a past medical history of Anxiety, Depression, Fibroids, GERD (gastroesophageal reflux disease), History of panic attacks, Hypertension, and Menopause.  She does not have any pertinent problems on file.  She  has a past surgical history that includes Tonsillectomy and Tracheostomy tube placement.  Her family history includes Deep vein thrombosis in her mother and sister; Diabetes in her mother; Hypertension in her father, maternal grandfather, maternal grandmother, mother, paternal grandfather, and paternal grandmother; Leukemia in her paternal grandfather; Stomach cancer in her maternal grandfather; Throat cancer in her father.  She  reports that she has never smoked. She has never used smokeless tobacco. She " "reports that she drinks alcohol. She reports that she does not use drugs.  Current Outpatient Medications   Medication Sig Dispense Refill   • albuterol (ACCUNEB) 0.63 MG/3ML nebulizer solution Take 3 mL by nebulization Every 6 (Six) Hours As Needed for Wheezing. 60 vial 1   • budesonide-formoterol (SYMBICORT) 160-4.5 MCG/ACT inhaler Inhale 2 puffs 2 (Two) Times a Day. 1 inhaler 11   • fluconazole (DIFLUCAN) 200 MG tablet Take 1 tablet by mouth Daily. 1 tablet 0   • Fluticasone Furoate-Vilanterol 100-25 MCG/INH aerosol powder  Inhale 1 puff Daily. 2 each 0   • hydrocortisone 2.5 % cream Apply by topical route to the vulva as needed 45 g 2   • lisinopril (PRINIVIL,ZESTRIL) 5 MG tablet Daily.     • omeprazole (priLOSEC) 40 MG capsule Take 1 capsule by mouth Daily. 30 capsule 3   • temazepam (RESTORIL) 30 MG capsule Take 1 capsule by mouth as needed.     • venlafaxine XR (EFFEXOR-XR) 150 MG 24 hr capsule Take 1 capsule by mouth Daily.     • VENTOLIN  (90 Base) MCG/ACT inhaler Inhale 2 puffs Every 6 (Six) Hours As Needed for Wheezing. 1 inhaler 3     No current facility-administered medications for this visit.      She is allergic to codeine and premarin [conjugated estrogens]..        Review of Systems  A comprehensive review of systems was done.  Constitutional: negative for fever, chills, activity change, appetite change, fatigue and unexpected weight change.  Respiratory: negative  Cardiovascular: negative  Gastrointestinal: negative  Genitourinary:negative  Musculoskeletal:negative  Behavioral/Psych: negative          /86   Ht 160 cm (63\")   Wt 107 kg (236 lb 3.2 oz)   LMP  (LMP Unknown)   Breastfeeding? No   BMI 41.84 kg/m²     Physical Exam    General:  alert; cooperative; well developed; well nourished  obese - Body mass index is 41.84 kg/m².   Skin:  No suspicious lesions seen   Thyroid: normal to inspection and palpation   Lungs:  clear to auscultation bilaterally   Heart:  regular rate and " rhythm, S1, S2 normal, no murmur, click, rub or gallop   Breasts:  Examined in supine position  Symmetric without masses or skin dimpling  Nipples normal without inversion, lesions or discharge  There are no palpable axillary nodes   Abdomen: soft, non-tender; no masses  no umbilical or inguinal hernias are present  no hepato-splenomegaly   Pelvis: Clinical staff was present for exam  External genitalia:  normal appearance of the external genitalia including Bartholin's and Doerun's glands.  Vaginal:  normal pink mucosa without prolapse or lesions.  Cervix:  normal appearance.  Uterus:  normal size, shape and consistency. anteverted;  Adnexa:  non palpable bilaterally.  Rectal:  anus visually normal appearing. recto-vaginal exam unremarkable and confirms findings;     Lab Review   CA-125 results    Imaging   Mammogram results    Medical counseling was given to patient for the following topics: diagnostic results, instructions for management, risk factor reductions, prognosis and impressions . Total time of the encounter was 19 minute(s) and 10 minute(s)  was spent in face to face counseling.  I have counseled the patient that we need to follow-up on her uterine leiomyomata and her solid left ovary with an ultrasound.  I have counseled the patient that she needs a repeat CA-125.  I have advised her to have a catheterized urine specimen obtained to evaluate for cystitis.  I have strongly advised her to follow a low carbohydrate diet and to begin to exercise.  I have instructed her in monthly self breast assessment.          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 She has never used smokeless tobacco.    reports that she drinks  alcohol.    reports that she does not use drugs.    Substance use counseling is not indicated based on patient history. Alcohol intake is social    PLAN    · Medications prescribed this encounter:  No orders of the defined types were placed in this encounter.  · US to evaluate uterus and ovaries  · Cath urine obtained  · CA-125 ordered  · Follow up: PRN based on results.  · Calcium, 600 mg/ Vit. D, 400 IU daily     *Please note that portions of this documentation may have been completed with a voice recognition program.  Efforts were made to edit this dictation, but occasional words may have been mistranscribed.     This note was electronically signed.    KISHA Melgar MD  September 26, 2019  11:22 AM

## 2019-09-27 ENCOUNTER — TELEPHONE (OUTPATIENT)
Dept: OBSTETRICS AND GYNECOLOGY | Facility: CLINIC | Age: 64
End: 2019-09-27

## 2019-09-27 NOTE — TELEPHONE ENCOUNTER
I have called the patient to notify her that her urinalysis was normal and there was no evidence of cystitis.

## 2019-11-21 ENCOUNTER — OFFICE VISIT (OUTPATIENT)
Dept: OBSTETRICS AND GYNECOLOGY | Facility: CLINIC | Age: 64
End: 2019-11-21

## 2019-11-21 VITALS
DIASTOLIC BLOOD PRESSURE: 84 MMHG | SYSTOLIC BLOOD PRESSURE: 132 MMHG | WEIGHT: 237.2 LBS | HEIGHT: 63 IN | BODY MASS INDEX: 42.03 KG/M2

## 2019-11-21 DIAGNOSIS — N95.2 VAGINAL ATROPHY: ICD-10-CM

## 2019-11-21 DIAGNOSIS — N83.202 OVARIAN CYST, LEFT: ICD-10-CM

## 2019-11-21 DIAGNOSIS — D25.2 SUBSEROSAL LEIOMYOMA OF UTERUS: ICD-10-CM

## 2019-11-21 DIAGNOSIS — D39.12 NEOPLASM OF UNCERTAIN BEHAVIOR OF LEFT OVARY: ICD-10-CM

## 2019-11-21 DIAGNOSIS — D25.1 LEIOMYOMA, INTRAMURAL: Primary | ICD-10-CM

## 2019-11-21 PROCEDURE — 99213 OFFICE O/P EST LOW 20 MIN: CPT | Performed by: OBSTETRICS & GYNECOLOGY

## 2019-11-21 NOTE — PROGRESS NOTES
Chief Complaint   Patient presents with   • Results     pelvic ultrasound today       Adore Brito is a 64 y.o. year old  presenting to be seen because of follow-up for a 3.3 cm solid mass of the left ovary as well as multiple uterine leiomyomata.  This patient had a CA-125 drawn on 2019 with a value of 17.8.  The solid mass in the left ovary is to be followed with an ultrasound today.  She has 2 intramural, one subserosal, and 1 cervical uterine leiomyoma present.  She has no postmenopausal bleeding.  She denies bowel or urinary symptoms.  She takes venlafaxine, 150 mg daily and has relief of menopausal symptoms.  She uses Premarin vaginal cream, 1 g weekly for relief of vaginal atrophy symptoms.  She has no side effects on these medications.    Social History     Substance and Sexual Activity   Sexual Activity No     SCREENING TESTS    Year 2012 2016 2017   Age                         PAP        Neg.                 HPV high risk                         Mammogram        benign                 MOSHE score                         Breast MRI                         Lipids                         Vitamin D                         Colonoscopy                         DEXA  Frax (hip/any)                         Ovarian Screen                             No Additional Complaints Reported    The following portions of the patient's history were reviewed and updated as appropriate:vital signs and   She  has a past medical history of Anxiety, Depression, Fibroids, GERD (gastroesophageal reflux disease), History of panic attacks, Hypertension, and Menopause.  She does not have any pertinent problems on file.  She  has a past surgical history that includes Tonsillectomy and Tracheostomy tube placement.  Her family history includes Deep vein thrombosis in her mother and sister; Diabetes in her mother; Hypertension in her  father, maternal grandfather, maternal grandmother, mother, paternal grandfather, and paternal grandmother; Leukemia in her paternal grandfather; Stomach cancer in her maternal grandfather; Throat cancer in her father.  She  reports that she has never smoked. She has never used smokeless tobacco. She reports that she drinks alcohol. She reports that she does not use drugs.  Current Outpatient Medications   Medication Sig Dispense Refill   • conjugated estrogens (PREMARIN) 0.625 MG/GM vaginal cream Insert 0.5 g into the vagina 1 (One) Time Per Week.     • albuterol (ACCUNEB) 0.63 MG/3ML nebulizer solution Take 3 mL by nebulization Every 6 (Six) Hours As Needed for Wheezing. 60 vial 1   • budesonide-formoterol (SYMBICORT) 160-4.5 MCG/ACT inhaler Inhale 2 puffs 2 (Two) Times a Day. 1 inhaler 11   • fluconazole (DIFLUCAN) 200 MG tablet Take 1 tablet by mouth Daily. 1 tablet 0   • Fluticasone Furoate-Vilanterol 100-25 MCG/INH aerosol powder  Inhale 1 puff Daily. 2 each 0   • hydrocortisone 2.5 % cream Apply by topical route to the vulva as needed 45 g 2   • lisinopril (PRINIVIL,ZESTRIL) 5 MG tablet Daily.     • omeprazole (priLOSEC) 40 MG capsule Take 1 capsule by mouth Daily. 30 capsule 3   • temazepam (RESTORIL) 30 MG capsule Take 1 capsule by mouth as needed.     • venlafaxine XR (EFFEXOR-XR) 150 MG 24 hr capsule Take 1 capsule by mouth Daily.     • VENTOLIN  (90 Base) MCG/ACT inhaler Inhale 2 puffs Every 6 (Six) Hours As Needed for Wheezing. 1 inhaler 3     No current facility-administered medications for this visit.      She is allergic to codeine and premarin [conjugated estrogens]..        Review of Systems  A comprehensive review of systems was done.  Constitutional: negative for fever, chills, activity change, appetite change, fatigue and unexpected weight change.  Respiratory: shortness of breath off and on  Cardiovascular: negative  Gastrointestinal: positive for  "constipation  Genitourinary:negative  Musculoskeletal:positive for back pain  Behavioral/Psych: negative          /84   Ht 160 cm (63\")   Wt 108 kg (237 lb 3.2 oz)   LMP  (LMP Unknown)   Breastfeeding? No   BMI 42.02 kg/m²     Physical Exam    General:  alert; cooperative; well developed; well nourished  obese - Body mass index is 42.02 kg/m².   Skin:  No suspicious lesions seen   Thyroid: normal to inspection and palpation   Lungs:  clear to auscultation bilaterally   Heart:  regular rate and rhythm, S1, S2 normal, no murmur, click, rub or gallop   Breasts:  Not performed.   Abdomen: soft, non-tender; no masses  no umbilical or inguinal hernias are present  no hepato-splenomegaly   Pelvis: Not performed.     Lab Review   CA-125= 17.8    Imaging   Pelvic ultrasound results-reveals a retroverted uterus measuring 56.3 cm³.  There is a cervical, 2 intramural, and a subserosal uterine leiomyoma present.  The ovaries are normal in size and there is a persistent, 3.3 cm solid mass in the left ovary.  This is unchanged.  And Mammogram results    Medical counseling was given to patient for the following topics: diagnostic results, instructions for management, prognosis, impressions and risks and benefits of treatment options . Total time of the encounter was 23 minute(s) and 19 minute(s)  was spent in face to face counseling.  I have reviewed the patient's history and ultrasound findings with her.  I have counseled the patient that she has for persistent uterine leiomyomata.  I have emphasized to her that they are not causing postmenopausal bleeding.  I have advised her that the solid area in the left ovary is stable at 3.3 cm.  I have advised her that her CA- 125 was normal.  I have given the patient the options of proceeding with a robotically-assisted TLH/BSO/VCS to remove the uterine leiomyomata and solid mass of the right ovary; vs. following her conservatively and repeating her CA-125 in 6 months along with " a possible repeat pelvic ultrasound.  She prefers a conservative form of management and will be observed.  I have advised her that if she develops pelvic pain or abdominal bloating she should call me immediately.          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 She has never used smokeless tobacco.    reports that she drinks alcohol.    reports that she does not use drugs.    Substance use counseling is not indicated based on patient history.  Her alcohol intake is social.      PLAN    · Medications prescribed this encounter:  No orders of the defined types were placed in this encounter.  · Continue monthly self breast assessment and annual breast imaging  · Contact me if she develops a change in appetite, pelvic pain, or abdominal bloating  · After the visit the ultrasound was reviewed by Dr. Yo.  He feels that the solid area on the left is most consistent with an intraligamentous leiomyoma rather than a solid ovarian mass.  · Follow up: 6 month(s)  · Calcium, 600 mg/ Vit. D, 400 IU daily, regular, weight-bearing exercise 4 days a week.     *Please note that portions of this documentation may have been completed with a voice recognition program.  Efforts were made to edit this dictation, but occasional words may have been mistranscribed.     This note was electronically signed.    KISHA Melgar MD  November 21, 2019  11:02 AM

## 2020-03-07 RX ORDER — AZITHROMYCIN 250 MG/1
TABLET, FILM COATED ORAL
Qty: 6 TABLET | Refills: 0 | Status: SHIPPED | OUTPATIENT
Start: 2020-03-07 | End: 2020-10-01

## 2020-05-28 ENCOUNTER — TELEPHONE (OUTPATIENT)
Dept: OBSTETRICS AND GYNECOLOGY | Facility: CLINIC | Age: 65
End: 2020-05-28

## 2020-05-28 NOTE — TELEPHONE ENCOUNTER
Patient had asked that we fill out paperwork and sent to [employer] Patient Assistance Program.  She is able to get Premarin Cream through this program.  I completed the paperwork and faxed to the company 5/1/2020.  The patient calls today and states that she has not received her cream and the company states that they have not received paperwork.  She is going to call them back and if they need anything further from us, she will let me know.

## 2020-06-01 ENCOUNTER — TELEPHONE (OUTPATIENT)
Dept: OBSTETRICS AND GYNECOLOGY | Facility: CLINIC | Age: 65
End: 2020-06-01

## 2020-06-01 NOTE — TELEPHONE ENCOUNTER
PT CALLING ASK FOR ANNE-MARIE TO CALL - ASKED WHAT REGARDING STATED TO INVOLVED WOULD MUCH RATHER JUST TALK TO HER   [phone]

## 2020-06-02 NOTE — TELEPHONE ENCOUNTER
Patient requests that I refax information to [employer] Patient [employer] Program.  This was done 5/1/2020.  [employer] states they do not have documents.

## 2020-06-02 NOTE — TELEPHONE ENCOUNTER
Attempted to contact patient at her request.  No answer, but I left a message on voicemail letting Adore know that I would be with patients all day, but would try to call her back later today.

## 2020-06-25 ENCOUNTER — TELEPHONE (OUTPATIENT)
Dept: OBSTETRICS AND GYNECOLOGY | Facility: CLINIC | Age: 65
End: 2020-06-25

## 2020-06-25 NOTE — TELEPHONE ENCOUNTER
Patient is attempting to get compassionate Premarin Vaginal cream from [employer].  Forms have been faxed X 2, now they state that she is approved, but they need a letter from Dr. Melgar stating that it is necessary for the medication to be mailed to the patient rather than mailed to the office.  They also require a prescription.  I advised the patient we would take care of these things for her tomorrow.

## 2020-09-18 ENCOUNTER — TRANSCRIBE ORDERS (OUTPATIENT)
Dept: OBSTETRICS AND GYNECOLOGY | Facility: CLINIC | Age: 65
End: 2020-09-18

## 2020-09-18 DIAGNOSIS — Z12.31 VISIT FOR SCREENING MAMMOGRAM: Primary | ICD-10-CM

## 2020-10-01 ENCOUNTER — OFFICE VISIT (OUTPATIENT)
Dept: OBSTETRICS AND GYNECOLOGY | Facility: CLINIC | Age: 65
End: 2020-10-01

## 2020-10-01 ENCOUNTER — LAB (OUTPATIENT)
Dept: LAB | Facility: HOSPITAL | Age: 65
End: 2020-10-01

## 2020-10-01 VITALS
SYSTOLIC BLOOD PRESSURE: 146 MMHG | WEIGHT: 236.8 LBS | TEMPERATURE: 97.1 F | BODY MASS INDEX: 41.96 KG/M2 | HEIGHT: 63 IN | DIASTOLIC BLOOD PRESSURE: 84 MMHG

## 2020-10-01 DIAGNOSIS — Z01.419 ENCOUNTER FOR GYNECOLOGICAL EXAMINATION WITHOUT ABNORMAL FINDING: ICD-10-CM

## 2020-10-01 DIAGNOSIS — D39.12 NEOPLASM OF UNCERTAIN BEHAVIOR OF LEFT OVARY: ICD-10-CM

## 2020-10-01 DIAGNOSIS — N83.202 OVARIAN CYST, LEFT: ICD-10-CM

## 2020-10-01 DIAGNOSIS — Z78.0 MENOPAUSE: Primary | ICD-10-CM

## 2020-10-01 DIAGNOSIS — D25.2 SUBSEROSAL LEIOMYOMA OF UTERUS: ICD-10-CM

## 2020-10-01 DIAGNOSIS — D25.1 LEIOMYOMA, INTRAMURAL: ICD-10-CM

## 2020-10-01 LAB — CANCER AG125 SERPL QL: 19.8 U/ML (ref 0–38.1)

## 2020-10-01 PROCEDURE — G0101 CA SCREEN;PELVIC/BREAST EXAM: HCPCS | Performed by: OBSTETRICS & GYNECOLOGY

## 2020-10-01 PROCEDURE — 86304 IMMUNOASSAY TUMOR CA 125: CPT

## 2020-10-01 PROCEDURE — 36415 COLL VENOUS BLD VENIPUNCTURE: CPT

## 2020-10-01 NOTE — PROGRESS NOTES
Chief Complaint   Patient presents with   • Gynecologic Exam   • Fibroids     c/o cramping       Adore Brito is a 65 y.o. year old  presenting to be seen for her annual exam.  This patient is being followed in menopause with one subserosal and 2 intramural uterine leiomyomata of the uterus.  She has no postmenopausal bleeding.  She is being followed with CA-125 results due to a left ovarian cyst.  These values have been normal.  She is complaining of some abdominal cramping.  She has not had a colonoscopy in 20 years.  She denies bloating.  She denies weight loss.  She denies nausea and vomiting.  She has no urinary symptoms.    SCREENING TESTS    Year 2012   Age                         PAP       Neg.                  HPV high risk                         Mammogram        benign                 MOSHE score                         Breast MRI                         Lipids                         Vitamin D                         Colonoscopy                         DEXA  Frax (hip/any)                         Ovarian Screen        L cyst                     She exercises regularly: no.  She wears her seat belt: yes.  She has concerns about domestic violence: no.  She has noticed changes in height: no    GYN screening history:  · Last pap: was done on approximately 2018 and the result was: normal PAP.  · Last mammogram: was done on approximately 3/1/2019 and the result was: Birads I (Normal)..    No Additional Complaints Reported    The following portions of the patient's history were reviewed and updated as appropriate:vital signs and   She  has a past medical history of Anxiety, Asthma, Depression, Fibroids, GERD (gastroesophageal reflux disease), History of panic attacks, Hypertension, and Menopause.  She does not have any pertinent problems on file.  She  has a past surgical history that includes  Tonsillectomy and Tracheostomy tube placement.  Her family history includes Deep vein thrombosis in her mother and sister; Diabetes in her mother; Hypertension in her father, maternal grandfather, maternal grandmother, mother, paternal grandfather, and paternal grandmother; Leukemia in her paternal grandfather; Stomach cancer in her maternal grandfather; Throat cancer in her father.  She  reports that she has never smoked. She has never used smokeless tobacco. She reports current alcohol use. She reports that she does not use drugs.  Current Outpatient Medications   Medication Sig Dispense Refill   • albuterol (ACCUNEB) 0.63 MG/3ML nebulizer solution Take 3 mL by nebulization Every 6 (Six) Hours As Needed for Wheezing. 60 vial 1   • budesonide-formoterol (SYMBICORT) 160-4.5 MCG/ACT inhaler Inhale 2 puffs 2 (Two) Times a Day. 1 inhaler 11   • conjugated estrogens (PREMARIN) 0.625 MG/GM vaginal cream Insert 0.5 g into the vagina 1 (One) Time Per Week.     • lisinopril (PRINIVIL,ZESTRIL) 5 MG tablet Daily.     • omeprazole (priLOSEC) 40 MG capsule Take 1 capsule by mouth Daily. 30 capsule 3   • temazepam (RESTORIL) 30 MG capsule Take 1 capsule by mouth as needed.     • venlafaxine XR (EFFEXOR-XR) 150 MG 24 hr capsule Take 1 capsule by mouth Daily.     • VENTOLIN  (90 Base) MCG/ACT inhaler Inhale 2 puffs Every 6 (Six) Hours As Needed for Wheezing. 1 inhaler 3     No current facility-administered medications for this visit.      She is allergic to codeine..    Review of Systems  A review of systems was taken.  She denies cough, fever, shortness of breath, and loss of her sense of taste or smell.  Constitutional: negative for fever, chills, activity change, appetite change, fatigue and unexpected weight change.  Respiratory: negative  Cardiovascular: negative  Gastrointestinal: positive for cramping  Genitourinary:negative  Musculoskeletal:negative  Behavioral/Psych: negative       /84   Temp 97.1 °F (36.2  "°C)   Ht 160 cm (63\")   Wt 107 kg (236 lb 12.8 oz)   LMP  (LMP Unknown)   Breastfeeding No   BMI 41.95 kg/m²     Physical Exam    General:  alert; cooperative; well developed; well nourished  obese - Body mass index is 41.95 kg/m².   Skin:  No suspicious lesions seen   Thyroid: normal to inspection and palpation   Lungs:  clear to auscultation bilaterally   Heart:  regular rate and rhythm, S1, S2 normal, no murmur, click, rub or gallop   Breasts:  Examined in supine position  Symmetric without masses or skin dimpling  Nipples normal without inversion, lesions or discharge  There are no palpable axillary nodes  pendulous   Abdomen: soft, non-tender; no masses  no umbilical or inguinal hernias are present  no hepato-splenomegaly  obese   Pelvis: Clinical staff was present for exam  External genitalia:  normal appearance of the external genitalia including Bartholin's and Galateo's glands.  Vaginal:  normal pink mucosa without prolapse or lesions.  Cervix:  normal appearance.  Uterus:  Asymmetric and ULNS, anteverted  Adnexa:  non palpable bilaterally.  Rectal:  anus visually normal appearing. 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 She has never used smokeless tobacco.   reports current alcohol use.   reports no history of drug use.    Substance use counseling is not indicated based on patient history.  She indicates that her alcohol use is social, and controlled.      PLAN    · Medications prescribed this encounter:  No orders of the defined types were placed in this encounter.  · CA-125 ordered  · Monthly self breast assessment and annual breast imaging  · I have encouraged her to exercise more and follow a low carbohydrate diet.  · Pelvic ultrasound to be repeated.  If the ultrasound and CA-125 are normal I will request that the patient have colonoscopy  · Calcium, 600 mg/ Vit. D, 400 IU daily; regular weight-bearing exercise  · Follow up: 2 week(s)  *Please note that portions of this documentation may have been completed with a voice recognition program.  Efforts were made to edit this dictation, but occasional words may have been mistranscribed.       This note was electronically signed.    KISHA Melgar MD  October 1, 2020  10:12 EDT

## 2020-10-13 ENCOUNTER — OFFICE VISIT (OUTPATIENT)
Dept: PULMONOLOGY | Facility: CLINIC | Age: 65
End: 2020-10-13

## 2020-10-13 VITALS
OXYGEN SATURATION: 93 % | SYSTOLIC BLOOD PRESSURE: 102 MMHG | WEIGHT: 234 LBS | HEIGHT: 63 IN | DIASTOLIC BLOOD PRESSURE: 80 MMHG | HEART RATE: 86 BPM | TEMPERATURE: 98.2 F | BODY MASS INDEX: 41.46 KG/M2

## 2020-10-13 DIAGNOSIS — J45.40 MODERATE PERSISTENT ASTHMA WITHOUT COMPLICATION: Primary | ICD-10-CM

## 2020-10-13 DIAGNOSIS — J30.2 CHRONIC SEASONAL ALLERGIC RHINITIS: ICD-10-CM

## 2020-10-13 DIAGNOSIS — Z23 IMMUNIZATION DUE: ICD-10-CM

## 2020-10-13 PROCEDURE — 90670 PCV13 VACCINE IM: CPT | Performed by: NURSE PRACTITIONER

## 2020-10-13 PROCEDURE — 99213 OFFICE O/P EST LOW 20 MIN: CPT | Performed by: NURSE PRACTITIONER

## 2020-10-13 PROCEDURE — 90471 IMMUNIZATION ADMIN: CPT | Performed by: NURSE PRACTITIONER

## 2020-10-13 RX ORDER — ALBUTEROL SULFATE 90 UG/1
2 AEROSOL, METERED RESPIRATORY (INHALATION) EVERY 6 HOURS PRN
Qty: 18 G | Refills: 3 | Status: SHIPPED | OUTPATIENT
Start: 2020-10-13 | End: 2021-10-15 | Stop reason: SDUPTHER

## 2020-10-13 NOTE — PROGRESS NOTES
[facility] Follow up    CHIEF COMPLAINT    Asthma     Subjective   HISTORY OF PRESENT ILLNESS    Adore Brito is a 65 y.o.female here today for routine follow-up on her mild intermittent asthma.  Her asthma is usually seasonal or environmental.  She uses her Breo as needed.  Usually when she is working in the yard or seasonally.  She has had very little to no rescue inhaler use.  She said no acute exacerbations or illnesses.  No use of antibiotics or steroids over the last year.    She still remains quite active at home.  She said no change in her dyspnea or wheezing throughout the year.  She has been working at home during the Covid epidemic.      Patient Active Problem List   Diagnosis   • Menopause   • Hypertension   • History of panic attacks   • GERD (gastroesophageal reflux disease)   • Depression   • Anxiety   • Vaginal atrophy   • Leiomyoma, intramural   • Subserosal leiomyoma of uterus   • Ovarian cyst, left   • Moderate persistent asthma without complication   • Chronic seasonal allergic rhinitis   • Fibroids   • Morbidly obese (CMS/HCC)       Allergies   Allergen Reactions   • Codeine Nausea And Vomiting       Current Outpatient Medications:   •  conjugated estrogens (PREMARIN) 0.625 MG/GM vaginal cream, Insert 0.5 g into the vagina 1 (One) Time Per Week., Disp: , Rfl:   •  Fluticasone Furoate-Vilanterol (Breo Ellipta) 100-25 MCG/INH inhaler, Inhale 1 puff Daily., Disp: , Rfl:   •  lisinopril (PRINIVIL,ZESTRIL) 5 MG tablet, Daily., Disp: , Rfl:   •  omeprazole (priLOSEC) 40 MG capsule, Take 1 capsule by mouth Daily., Disp: 30 capsule, Rfl: 3  •  temazepam (RESTORIL) 30 MG capsule, Take 1 capsule by mouth as needed., Disp: , Rfl:   •  venlafaxine XR (EFFEXOR-XR) 150 MG 24 hr capsule, Take 1 capsule by mouth Daily., Disp: , Rfl:   •  Ventolin  (90 Base) MCG/ACT inhaler, Inhale 2 puffs Every 6 (Six) Hours As Needed for Wheezing., Disp: 18 g, Rfl: 3  MEDICATION LIST AND ALLERGIES  "REVIEWED.    Social History     Tobacco Use   • Smoking status: Never Smoker   • Smokeless tobacco: Never Used   Substance Use Topics   • Alcohol use: Yes     Comment: rarely   • Drug use: No       FAMILY AND SOCIAL HISTORY REVIEWED.    Review of Systems   Constitutional: Negative for chills, fatigue, fever and unexpected weight change.   HENT: Negative for congestion, nosebleeds, postnasal drip, rhinorrhea, sinus pressure and trouble swallowing.    Respiratory: Negative for cough, chest tightness, shortness of breath and wheezing.    Cardiovascular: Negative for chest pain and leg swelling.   Gastrointestinal: Negative for abdominal pain, constipation, diarrhea, nausea and vomiting.   Genitourinary: Negative for dysuria, frequency, hematuria and urgency.   Musculoskeletal: Negative for myalgias.   Neurological: Negative for dizziness, weakness, numbness and headaches.   All other systems reviewed and are negative.  .  Objective   /80   Pulse 86   Temp 98.2 °F (36.8 °C)   Ht 160 cm (63\")   Wt 106 kg (234 lb)   LMP  (LMP Unknown)   SpO2 93% Comment: resting at room air  BMI 41.45 kg/m²     Immunization History   Administered Date(s) Administered   • Flu Vaccine Quad PF >18YRS 11/16/2015   • Flu Vaccine Quad PF >36MO 09/14/2017, 10/15/2018   • Influenza Quad Vaccine (Inpatient) 09/14/2017   • Pneumococcal Conjugate 13-Valent (PCV13) 10/13/2020   • Tdap 09/14/2017       Physical Exam  Vitals signs reviewed.   Constitutional:       Appearance: She is well-developed.   HENT:      Head: Normocephalic and atraumatic.   Eyes:      Pupils: Pupils are equal, round, and reactive to light.   Neck:      Musculoskeletal: Normal range of motion and neck supple.   Cardiovascular:      Rate and Rhythm: Normal rate and regular rhythm.      Heart sounds: No murmur.   Pulmonary:      Effort: Pulmonary effort is normal. No respiratory distress.      Breath sounds: Normal breath sounds. No wheezing or rales.   Abdominal:     "  General: Bowel sounds are normal. There is no distension.      Palpations: Abdomen is soft.   Musculoskeletal: Normal range of motion.   Skin:     General: Skin is warm and dry.      Findings: No erythema.   Neurological:      Mental Status: She is alert and oriented to person, place, and time.   Psychiatric:         Behavior: Behavior normal.           RESULTS    PFTS in the office today, read by me:  Unable to complete secondary to the COVID epidemic        Assessment/Plan     PROBLEM LIST         ICD-10-CM ICD-9-CM   1. Moderate persistent asthma without complication  J45.40 493.90   2. Chronic seasonal allergic rhinitis  J30.2 477.8   3. Immunization due  Z23 V05.9       Problem List Items Addressed This Visit        Respiratory    Moderate persistent asthma without complication - Primary    Relevant Medications    Fluticasone Furoate-Vilanterol (Breo Ellipta) 100-25 MCG/INH inhaler    Ventolin  (90 Base) MCG/ACT inhaler    Chronic seasonal allergic rhinitis      Other Visit Diagnoses     Immunization due        Relevant Orders    Pneumococcal Conjugate Vaccine 13-Valent All (Completed)            DISCUSSION    At this time her asthma is well controlled.  She will continue on the Breo as needed and albuterol HFA.  She has used Entocort in the past with success as well.  We did discuss the Symbicort has now gone to a generic format, if after the first of the year her insurance better covers the Symbicort she will call back and let me know.    She is up-to-date on her flu vaccine but does need her Prevnar 13.    Follow-up in 1 year with full PFTs and a chest x-ray.    I spent 15 minutes face to face with the patient. I spent > 50% percent of this time counseling and discussing evaluation, current status and management.    Karli Thomas, ROCCO  10/13/065497:11 EDT  Electronically signed     Please note that portions of this note were completed with a voice recognition program. Efforts were made to edit the  dictations, but occasionally words are mistranscribed.      CC: Provider, No Known

## 2020-11-09 ENCOUNTER — OFFICE VISIT (OUTPATIENT)
Dept: OBSTETRICS AND GYNECOLOGY | Facility: CLINIC | Age: 65
End: 2020-11-09

## 2020-11-09 VITALS
SYSTOLIC BLOOD PRESSURE: 132 MMHG | HEIGHT: 63 IN | WEIGHT: 234 LBS | TEMPERATURE: 97.1 F | DIASTOLIC BLOOD PRESSURE: 88 MMHG | BODY MASS INDEX: 41.46 KG/M2

## 2020-11-09 DIAGNOSIS — Z78.0 MENOPAUSE: ICD-10-CM

## 2020-11-09 DIAGNOSIS — D25.1 LEIOMYOMA, INTRAMURAL: ICD-10-CM

## 2020-11-09 DIAGNOSIS — D25.2 SUBSEROSAL LEIOMYOMA OF UTERUS: Primary | ICD-10-CM

## 2020-11-09 PROCEDURE — 99213 OFFICE O/P EST LOW 20 MIN: CPT | Performed by: OBSTETRICS & GYNECOLOGY

## 2020-11-09 RX ORDER — LISINOPRIL 20 MG/1
20 TABLET ORAL EVERY MORNING
COMMUNITY
Start: 2020-10-19 | End: 2022-07-19 | Stop reason: ALTCHOICE

## 2020-11-09 NOTE — PROGRESS NOTES
Chief Complaint   Patient presents with   • Results     pelvic ultrasound today       Adore Brito is a 65 y.o. year old  presenting to be seen because of follow-up for a pelvic ultrasound.  This patient has a history of intramural and subserosal uterine leiomyomata varying from 1.0cm to 3.5 cm in size.  She has no postmenopausal bleeding.  She has had a stable left ovarian cyst.  She has been followed with ultrasound scans and with CA-125 results.  Her CA-125 on 10/1/2020 was 19.8 units/mL.  She denies abdominal bloating.  She does not desire surgical intervention unless absolutely necessary.  She denies bowel or urinary symptoms.    Social History     Substance and Sexual Activity   Sexual Activity Defer     SCREENING TESTS    Year 2012   Age                         PAP       Neg.                  HPV high risk                         Mammogram        benign                 MOSHE score                         Breast MRI                         Lipids                         Vitamin D                         Colonoscopy                         DEXA  Frax (hip/any)                         Ovarian Screen                             No Additional Complaints Reported    The following portions of the patient's history were reviewed and updated as appropriate:vital signs and   She  has a past medical history of Anxiety, Asthma, Depression, Fibroids, GERD (gastroesophageal reflux disease), History of panic attacks, Hypertension, and Menopause.  She does not have any pertinent problems on file.  She  has a past surgical history that includes Tonsillectomy and Tracheostomy tube placement.  Her family history includes Deep vein thrombosis in her mother and sister; Diabetes in her mother; Hypertension in her father, maternal grandfather, maternal grandmother, mother, paternal grandfather, and paternal grandmother;  "Leukemia in her paternal grandfather; Stomach cancer in her maternal grandfather; Throat cancer in her father.  She  reports that she has never smoked. She has never used smokeless tobacco. She reports current alcohol use. She reports that she does not use drugs.  Current Outpatient Medications   Medication Sig Dispense Refill   • conjugated estrogens (PREMARIN) 0.625 MG/GM vaginal cream Insert 0.5 g into the vagina 1 (One) Time Per Week.     • Fluticasone Furoate-Vilanterol (Breo Ellipta) 100-25 MCG/INH inhaler Inhale 1 puff Daily.     • lisinopril (PRINIVIL,ZESTRIL) 20 MG tablet Take 20 mg by mouth Every Morning.     • omeprazole (priLOSEC) 40 MG capsule Take 1 capsule by mouth Daily. 30 capsule 3   • temazepam (RESTORIL) 30 MG capsule Take 1 capsule by mouth as needed.     • venlafaxine XR (EFFEXOR-XR) 150 MG 24 hr capsule Take 1 capsule by mouth Daily.     • Ventolin  (90 Base) MCG/ACT inhaler Inhale 2 puffs Every 6 (Six) Hours As Needed for Wheezing. 18 g 3     No current facility-administered medications for this visit.      She is allergic to codeine..        Review of Systems  A review of systems was done.  She denies cough, fever, shortness of breath, and loss of her sense of taste or smell  Constitutional: negative for fever, chills, activity change, appetite change, fatigue and unexpected weight change.  Respiratory: negative  Cardiovascular: negative  Gastrointestinal: negative  Genitourinary:negative  Musculoskeletal:negative  Behavioral/Psych: negative     Counseling/Anticipatory Guidance Discussed: nutrition, physical activity, healthy weight, screenings, self-breast exam and menopausal bleeding     /88   Temp 97.1 °F (36.2 °C)   Ht 160 cm (63\")   Wt 106 kg (234 lb)   LMP  (LMP Unknown)   Breastfeeding No   BMI 41.45 kg/m²     Physical Exam    General:  alert; cooperative; well developed; well nourished  obese - Body mass index is 41.45 kg/m².   Skin:  No suspicious lesions seen "   Thyroid: normal to inspection and palpation   Lungs:  clear to auscultation bilaterally   Heart:  regular rate and rhythm, S1, S2 normal, no murmur, click, rub or gallop   Breasts:  Not performed.   Abdomen: soft, non-tender; no masses  no umbilical or inguinal hernias are present  no hepato-splenomegaly   Pelvis: Not performed.     Lab Review   CA-125 results= 19.8 U/mL    Imaging   Mammogram results  Pelvic ultrasound-reveals 2 subserous and 2 intramural uterine leiomyomata.  The uterine volume is 64.4 cm³.  The uterus is retroverted.  The right ovary is normal.  There is a hypoechoic cyst of the left ovary which is stable.    Medical counseling was given to patient for the following topics: diagnostic results, instructions for management, prognosis, impressions and risks and benefits of treatment options . Total time of the encounter was 19 minute(s) and 15 minute(s)  was spent in face to face counseling.  I have reviewed the patient's ultrasound findings with her.  I have advised the patient that she has 2 subserous and 2 intramural uterine leiomyomata.  I have advised her that her overall uterine volume is 64.4 cm³.  I have advised her that her right ovary is normal and that the left ovary continues to show a stable hypoechoic cyst.  I have advised her that her last CA-125 was normal.  I have reviewed options of therapy including careful observation versus surgical intervention.  The patient does not desire surgery.  I have advised her to have a repeat pelvic ultrasound and a repeat CA-125 at the time of her annual visit in October 2021.  I have counseled her to contact me should she have change in appetite, weight loss, or bloating.  I have advised her to contact me should she have any postmenopausal bleeding or spotting.  I have recommended that she establish care with a PCP, however she does not desire to do this at present.  She does not desire to have further laboratory evaluation other than her CA-125.               ASSESSMENT  Problems Addressed this Visit        Genitourinary    Menopause    Leiomyoma, intramural    Subserosal leiomyoma of uterus - Primary      Diagnoses       Codes Comments    Subserosal leiomyoma of uterus    -  Primary ICD-10-CM: D25.2  ICD-9-CM: 218.2     Leiomyoma, intramural     ICD-10-CM: D25.1  ICD-9-CM: 218.1     Menopause     ICD-10-CM: Z78.0  ICD-9-CM: 627.2            Substance History:    reports that she has never smoked. She has never used smokeless tobacco.    reports current alcohol use.    reports no history of drug use.    Substance use counseling is not indicated based on patient history.  She denies excessive alcohol use.      PLAN    · Medications prescribed this encounter:  No orders of the defined types were placed in this encounter.  · Monthly self breast assessment and annual breast imaging  · Contact me for any change in symptoms or for any postmenopausal bleeding  · Follow up: 11 month(s) for a repeat pelvic ultrasound and a CA-125  · Calcium, 600 mg/ Vit. D, 400 IU daily, regular, weight-bearing exercise 4 days a week     *Please note that portions of this documentation may have been completed with a voice recognition program.  Efforts were made to edit this dictation, but occasional words may have been mistranscribed.     This note was electronically signed.    KISHA Melgar MD  November 9, 2020  15:05 EST

## 2021-01-07 ENCOUNTER — APPOINTMENT (OUTPATIENT)
Dept: MAMMOGRAPHY | Facility: HOSPITAL | Age: 66
End: 2021-01-07

## 2021-04-21 ENCOUNTER — TELEPHONE (OUTPATIENT)
Dept: OBSTETRICS AND GYNECOLOGY | Facility: CLINIC | Age: 66
End: 2021-04-21

## 2021-04-21 NOTE — TELEPHONE ENCOUNTER
PT CALLING SHE HAS [employer] AND [employer] HAS HELPED HER LAST TIME WITH THE PRESCRIPTION AND SHE HAS CALLED THEM TODAY AND THEY NEED TO SEND NEW FORMS TO [employer] TO FILL OUT - I TOLD HER I WOULD HAVE [employer] CALL VERSUS GIVING THE EMAIL TO HER - THEY ADVISED THEY HAVE HER FORMS ON FILE - BUT STILL NEED SOMETHING FILLED OUT PRIOR. PLEASE CALL PATIENT.

## 2021-04-21 NOTE — TELEPHONE ENCOUNTER
"I have contacted the patient at her request.  Yearly I help the patient with her \"patient assistance\" forms from [employer], which helps get her Premarin Vaginal Cream at a discount.  The patient is asking for an email address so they ([employer]) can send the documents.  I have given the patient my email address and will fill out the documents when they are received.  "

## 2021-08-18 ENCOUNTER — HOSPITAL ENCOUNTER (OUTPATIENT)
Dept: MAMMOGRAPHY | Facility: HOSPITAL | Age: 66
Discharge: HOME | End: 2021-08-18
Admitting: OBSTETRICS & GYNECOLOGY

## 2021-08-18 DIAGNOSIS — Z12.31 VISIT FOR SCREENING MAMMOGRAM: ICD-10-CM

## 2021-08-18 PROCEDURE — 77063 BREAST TOMOSYNTHESIS BI: CPT | Performed by: RADIOLOGY

## 2021-08-18 PROCEDURE — 77063 BREAST TOMOSYNTHESIS BI: CPT

## 2021-08-18 PROCEDURE — 77067 SCR MAMMO BI INCL CAD: CPT

## 2021-08-18 PROCEDURE — 77067 SCR MAMMO BI INCL CAD: CPT | Performed by: RADIOLOGY

## 2021-10-11 ENCOUNTER — OFFICE VISIT (OUTPATIENT)
Dept: OBSTETRICS AND GYNECOLOGY | Facility: CLINIC | Age: 66
End: 2021-10-11

## 2021-10-11 ENCOUNTER — TELEPHONE (OUTPATIENT)
Dept: OBSTETRICS AND GYNECOLOGY | Facility: CLINIC | Age: 66
End: 2021-10-11

## 2021-10-11 VITALS
DIASTOLIC BLOOD PRESSURE: 80 MMHG | SYSTOLIC BLOOD PRESSURE: 138 MMHG | RESPIRATION RATE: 16 BRPM | WEIGHT: 231 LBS | BODY MASS INDEX: 40.92 KG/M2

## 2021-10-11 DIAGNOSIS — D25.2 SUBSEROSAL LEIOMYOMA OF UTERUS: ICD-10-CM

## 2021-10-11 DIAGNOSIS — N83.209 CYST OF OVARY, UNSPECIFIED LATERALITY: ICD-10-CM

## 2021-10-11 DIAGNOSIS — D39.12 NEOPLASM OF UNCERTAIN BEHAVIOR OF LEFT OVARY: Primary | ICD-10-CM

## 2021-10-11 DIAGNOSIS — N95.2 VAGINAL ATROPHY: ICD-10-CM

## 2021-10-11 DIAGNOSIS — Z12.11 SCREENING FOR MALIGNANT NEOPLASM OF COLON: ICD-10-CM

## 2021-10-11 DIAGNOSIS — D25.9 UTERINE LEIOMYOMA, UNSPECIFIED LOCATION: Primary | ICD-10-CM

## 2021-10-11 DIAGNOSIS — D25.1 LEIOMYOMA, INTRAMURAL: ICD-10-CM

## 2021-10-11 PROCEDURE — 99213 OFFICE O/P EST LOW 20 MIN: CPT | Performed by: NURSE PRACTITIONER

## 2021-10-11 RX ORDER — LISINOPRIL 10 MG/1
10 TABLET ORAL EVERY MORNING
COMMUNITY
Start: 2021-08-08

## 2021-10-11 RX ORDER — VENLAFAXINE HYDROCHLORIDE 75 MG/1
75 CAPSULE, EXTENDED RELEASE ORAL EVERY MORNING
COMMUNITY
Start: 2021-08-08 | End: 2022-07-19 | Stop reason: ALTCHOICE

## 2021-10-11 NOTE — TELEPHONE ENCOUNTER
Antonia,    I saw this patient today for her annual exam.  She is using premarin vaginal cream that she receives from the company bc she can't afford it.  She said you have always taken care of this for her.  No rush, she said she still has some for now.  I will send in refills just as Dr. Melgar has prescribed it, just let me know how to proceed.      Thanks!   Roxy

## 2021-10-11 NOTE — PROGRESS NOTES
Annual Visit     Patient Name: Adore Brito  : 1955   MRN: [id number]   Care Team: Patient Care Team:  Josesito Melgar MD as PCP - General (Gynecology)  Josesito Melgar MD as Consulting Physician (Gynecology)    Chief Complaint:    F/u left ovarian cyst   Uterine fibroids   Vaginal atrophy     HPI: Adore Brito is a 66 y.o. year old  presenting to be seen for her gynecologic exam.   Hx intramural and subserosal fibroids varying from 1cm to 3.5cm in size - 4 in total   Stable left ovarian cyst    done 10/2020 19.8   She denies pelvic pain, pressure, bloating, or wt loss     Doing well with vaginal estrogen cream - needs refill   States she receives the medication from the company bc it is too expensive     Mammogram 2021 birads 1     DEXA 2017 WNL     Colonoscopy - probably 20 yrs ago per pt       Subjective      /80   Resp 16   Wt 105 kg (231 lb)   LMP  (LMP Unknown)   Breastfeeding No   BMI 40.92 kg/m²     BMI reviewed: Body mass index is 40.92 kg/m².      Objective     Physical Exam    Neuro: alert and oriented to person, place and time   General:  alert; cooperative; well developed; well nourished   Skin:  No suspicious lesions seen   Thyroid: normal to inspection and palpation   Lungs:  breathing is unlabored  clear to auscultation bilaterally   Heart:  regular rate and rhythm, S1, S2 normal, no murmur, click, rub or gallop  normal apical impulse   Breasts:  Examined in supine position  Symmetric without masses or skin dimpling  Nipples normal without inversion, lesions or discharge  There are no palpable axillary nodes   Abdomen: soft, non-tender; no masses  no umbilical or inguinal hernias are present  no hepato-splenomegaly   Pelvis: Clinical staff was present for exam  External genitalia:  normal appearance of the external genitalia including Bartholin's and [place]'s glands.  :  urethral meatus normal;  Vaginal:  atrophic mucosal changes are present;  Cervix:   normal appearance.  Uterus:  not palpable.  Adnexa:  non palpable bilaterally.  Rectal:  digital rectal exam not performed; anus visually normal appearing.         Assessment / Plan      Assessment  Problems Addressed This Visit    ICD-10-CM ICD-9-CM   1. 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 Subserosal leiomyoma of uterus  D25.2 218.2   3. Leiomyoma, intramural  D25.1 218.1   4. Vaginal atrophy  N95.2 627.3   5. Screening for malignant neoplasm of colon  Z12.11 V76.51       Plan    Reviewed preliminary USTV report with pt - discussed findings are stable   If official u/s report notes any changes, I will call her to discuss   Rpt    Pt states she and Dr. Melgar discussed annual f/u as long as she remains asymptomatic with u/s and  - she wants to cont with this plan   She will call with changes in appetite, bloating, or weight loss     Cont with vaginal estrogen cream once weekly at bedtime - will discuss refills with Antonia Case MA as she has taken care of this for the pt     Reviewed DEXA report   Discussed Calcium, 600 mg/ Vit. D, 400 IU daily; regular weight-bearing exercise    Colonoscopy ordered     AV 1 yr             Follow Up  Return in about 1 year (around 10/11/2022) for Annual physical.  Patient was given instructions and counseling regarding her condition or for health maintenance advice. Please see specific information pulled into the AVS if appropriate.     Roxy William, ROCCO  October 11, 2021  11:53 EDT

## 2021-10-12 NOTE — TELEPHONE ENCOUNTER
Roxy,    This patient participates in the [employer] Patient Assistance Program, and there is paperwork that has to be filled out to obtain her prescription each year.  I have a copy of what I did in May 2021 and have shown this to Nikki.  We will work on this together and see that the patient gets what she needs.    Antonia

## 2021-10-15 ENCOUNTER — LAB (OUTPATIENT)
Dept: LAB | Facility: HOSPITAL | Age: 66
End: 2021-10-15

## 2021-10-15 ENCOUNTER — OFFICE VISIT (OUTPATIENT)
Dept: PULMONOLOGY | Facility: CLINIC | Age: 66
End: 2021-10-15

## 2021-10-15 VITALS
SYSTOLIC BLOOD PRESSURE: 120 MMHG | BODY MASS INDEX: 39.69 KG/M2 | TEMPERATURE: 98 F | HEART RATE: 84 BPM | OXYGEN SATURATION: 95 % | WEIGHT: 224 LBS | HEIGHT: 63 IN | DIASTOLIC BLOOD PRESSURE: 80 MMHG

## 2021-10-15 DIAGNOSIS — J45.20 MILD INTERMITTENT ASTHMA WITHOUT COMPLICATION: Primary | ICD-10-CM

## 2021-10-15 DIAGNOSIS — D39.12 NEOPLASM OF UNCERTAIN BEHAVIOR OF LEFT OVARY: ICD-10-CM

## 2021-10-15 DIAGNOSIS — J30.2 CHRONIC SEASONAL ALLERGIC RHINITIS: ICD-10-CM

## 2021-10-15 LAB — CANCER AG125 SERPL QL: 18.9 U/ML (ref 0–38.1)

## 2021-10-15 PROCEDURE — 36415 COLL VENOUS BLD VENIPUNCTURE: CPT

## 2021-10-15 PROCEDURE — 86304 IMMUNOASSAY TUMOR CA 125: CPT

## 2021-10-15 PROCEDURE — 99213 OFFICE O/P EST LOW 20 MIN: CPT | Performed by: NURSE PRACTITIONER

## 2021-10-15 RX ORDER — ALBUTEROL SULFATE 90 UG/1
2 AEROSOL, METERED RESPIRATORY (INHALATION) EVERY 6 HOURS PRN
Qty: 18 G | Refills: 3 | Status: SHIPPED | OUTPATIENT
Start: 2021-10-15 | End: 2022-11-10 | Stop reason: SDUPTHER

## 2021-10-15 NOTE — PROGRESS NOTES
"[facility] Pulmonary Follow up      Chief Complaint  Asthma and Follow-up    Subjective     {CC  Problem List  Visit Diagnosis   Encounters  Notes  Medications  Labs  Result Review Imaging  Media :23}     Adore Brito presents to [facility]ULMON[facility]ICINE for follow-up on her asthma.  We follow her annually.  This time she is doing very well.  She is only using her Breo as needed with any irritation, worsening cough or wheezing.  She does use her albuterol on rare occasions as well.  She had no acute issues, no exacerbations or infections.    She does continue to use loratadine daily.  No significant worsening seasonal allergies at this time.        Objective     Vital Signs:   /80   Pulse 84   Temp 98 °F (36.7 °C)   Ht 160 cm (63\")   Wt 102 kg (224 lb)   SpO2 95% Comment: resting, room air  BMI 39.68 kg/m²       Immunization History   Administered Date(s) Administered   • COVID-19 (MODERNA) 03/16/2021, 04/15/2021   • Flu Vaccine Quad PF >18YRS 11/16/2015   • Flu Vaccine Quad PF >36MO 09/14/2017, 10/15/2018   • Fluzone High-Dose 65+yrs 10/06/2020   • Influenza Quad Vaccine (Inpatient) 09/14/2017   • Pneumococcal Conjugate 13-Valent (PCV13) 10/13/2020   • Tdap 09/14/2017       Physical Exam  Vitals reviewed.   Constitutional:       Appearance: She is well-developed.   HENT:      Head: Normocephalic and atraumatic.   Eyes:      Pupils: Pupils are equal, round, and reactive to light.   Cardiovascular:      Rate and Rhythm: Normal rate and regular rhythm.      Heart sounds: No murmur heard.      Pulmonary:      Effort: Pulmonary effort is normal. No respiratory distress.      Breath sounds: Normal breath sounds. No wheezing or rales.   Abdominal:      General: Bowel sounds are normal. There is no distension.      Palpations: Abdomen is soft.   Musculoskeletal:         General: Normal range of motion.      Cervical back: Normal range of motion and neck supple. "   Skin:     General: Skin is warm and dry.      Findings: No erythema.   Neurological:      Mental Status: She is alert and oriented to person, place, and time.   Psychiatric:         Behavior: Behavior normal.          Result Review :            PFTs done in the office today:  Declined Covid testing    PA and lateral chest x-ray done the office today reviewed by me  Awaiting final MD interpretation                 Assessment and Plan    Problem List Items Addressed This Visit        Allergies and Adverse Reactions    Chronic seasonal allergic rhinitis       Pulmonary and Pneumonias    Mild intermittent asthma without complication - Primary    Relevant Medications    Ventolin  (90 Base) MCG/ACT inhaler    Other Relevant Orders    XR Chest PA & Lateral        At this time she is doing very well.  She has very rare to little albuterol or Breo use at this point.  Her asthma is well controlled.    Continue to follow-up annually or as needed.      Follow Up     No follow-ups on file.  Patient was given instructions and counseling regarding her condition or for health maintenance advice. Please see specific information pulled into the AVS if appropriate.     I spent 25 minutes caring for Adore on this date of service. This time includes time spent by me in the following activities:preparing for the visit, reviewing tests, obtaining and/or reviewing a separately obtained history, performing a medically appropriate examination and/or evaluation , counseling and educating the patient/family/caregiver, ordering medications, tests, or procedures, referring and communicating with other health care professionals , documenting information in the medical record and independently interpreting results and communicating that information with the patient/family/caregiver      ROCCO Hilario, ACNP  [facility]  Electronically signed

## 2022-04-14 ENCOUNTER — TELEPHONE (OUTPATIENT)
Dept: OBSTETRICS AND GYNECOLOGY | Facility: CLINIC | Age: 67
End: 2022-04-14

## 2022-06-02 ENCOUNTER — TELEPHONE (OUTPATIENT)
Dept: PULMONOLOGY | Facility: CLINIC | Age: 67
End: 2022-06-02

## 2022-06-02 RX ORDER — AZITHROMYCIN 250 MG/1
TABLET, FILM COATED ORAL
Qty: 6 TABLET | Refills: 0 | Status: SHIPPED | OUTPATIENT
Start: 2022-06-02 | End: 2022-07-19

## 2022-06-02 NOTE — TELEPHONE ENCOUNTER
Patient called, she thinks she has bronchitis and is requesting antibiotics. Offered an appointment and she declined.

## 2022-06-02 NOTE — TELEPHONE ENCOUNTER
A course of antibiotics was sent to their pharmacy on file.  May also use Mucinex and Delsym OTC for the cough and congestin.  Follow up in 5-7 days if no improvement with a CXR.

## 2022-07-19 ENCOUNTER — OFFICE VISIT (OUTPATIENT)
Dept: FAMILY MEDICINE CLINIC | Facility: CLINIC | Age: 67
End: 2022-07-19

## 2022-07-19 VITALS
HEART RATE: 69 BPM | RESPIRATION RATE: 16 BRPM | SYSTOLIC BLOOD PRESSURE: 130 MMHG | DIASTOLIC BLOOD PRESSURE: 78 MMHG | TEMPERATURE: 98.1 F | WEIGHT: 218 LBS | HEIGHT: 63 IN | BODY MASS INDEX: 38.62 KG/M2 | OXYGEN SATURATION: 96 %

## 2022-07-19 DIAGNOSIS — R68.84 CHRONIC JAW PAIN: Primary | ICD-10-CM

## 2022-07-19 DIAGNOSIS — I10 ESSENTIAL (PRIMARY) HYPERTENSION: ICD-10-CM

## 2022-07-19 DIAGNOSIS — G89.29 CHRONIC JAW PAIN: Primary | ICD-10-CM

## 2022-07-19 PROCEDURE — 99213 OFFICE O/P EST LOW 20 MIN: CPT

## 2022-07-19 RX ORDER — VENLAFAXINE HYDROCHLORIDE 150 MG/1
150 CAPSULE, EXTENDED RELEASE ORAL DAILY
COMMUNITY

## 2022-07-19 NOTE — PROGRESS NOTES
Chief Complaint  Jaw Pain    Subjective          History of Present Illness    Adore Daryl 67 y.o. female who presents today for a new patient appointment.  She has a history of:   Patient Active Problem List   Diagnosis   • Menopause   • Hypertension   • History of panic attacks   • GERD (gastroesophageal reflux disease)   • Depression   • Anxiety   • Vaginal atrophy   • Leiomyoma, intramural   • Subserosal leiomyoma of uterus   • Ovarian cyst, left   • Mild intermittent asthma without complication   • Chronic seasonal allergic rhinitis   • Fibroids   • Morbidly obese (HCC)   She is here to establish care and is a new patient to me I reviewed the PFSH recorded today by my MA/LPN staff.  She has been feeling well until recent jaw pain.     The patient reports intermittent jaw pain.  Pain is caused by unknown etiology.  The pain does not occur with opening mouth or chewing.  Symptom onset was 3 months ago. No similar episodes.  Treatment to date: aspirin with improvement.  Evaluation to date: none.  She denies chest pain, palpitations, shortness of breath, diaphoresis, numbness,  lightheadedness, dizziness, pre-syncope, and syncope.    She  denies medication side effects.    Review of Systems   Constitutional: Negative for chills, fatigue and fever.   HENT: Negative for congestion and sinus pressure.    Eyes: Negative for visual disturbance.   Respiratory: Negative for cough, chest tightness, shortness of breath and wheezing.    Cardiovascular: Negative for chest pain and palpitations.   Gastrointestinal: Negative for abdominal pain, constipation, diarrhea, nausea and vomiting.   Genitourinary: Negative for dysuria, frequency and urgency.   Musculoskeletal: Positive for arthralgias (jaw pain). Negative for back pain, joint swelling, neck pain and neck stiffness.   Skin: Negative for rash.   Neurological: Negative for dizziness, syncope, facial asymmetry, speech difficulty, weakness, light-headedness and numbness.  "  Psychiatric/Behavioral: Negative for behavioral problems and sleep disturbance.        Objective   Vital Signs:   /78   Pulse 69   Temp 98.1 °F (36.7 °C)   Resp 16   Ht 160 cm (63\")   Wt 98.9 kg (218 lb)   SpO2 96%   BMI 38.62 kg/m²      Class 2 Severe Obesity (BMI >=35 and <=39.9). Obesity-related health conditions include the following: hypertension and GERD. Obesity is newly identified. BMI is is above average; BMI management plan is completed. We discussed portion control and increasing exercise.        Physical Exam  Vitals and nursing note reviewed.   Constitutional:       General: She is not in acute distress.     Appearance: Normal appearance. She is well-developed. She is obese. She is not diaphoretic.   HENT:      Head: Normocephalic and atraumatic.   Eyes:      General: No scleral icterus.     Conjunctiva/sclera: Conjunctivae normal.      Pupils: Pupils are equal, round, and reactive to light.   Neck:      Thyroid: No thyromegaly.      Trachea: No tracheal deviation.   Cardiovascular:      Rate and Rhythm: Normal rate and regular rhythm.      Heart sounds: Normal heart sounds. No murmur heard.    No gallop.   Pulmonary:      Effort: Pulmonary effort is normal.   Abdominal:      Palpations: Abdomen is soft.   Musculoskeletal:         General: Tenderness present. No deformity. Normal range of motion.      Cervical back: Normal range of motion and neck supple.   Lymphadenopathy:      Cervical: No cervical adenopathy.   Skin:     General: Skin is warm and dry.      Findings: No rash.   Neurological:      Mental Status: She is alert and oriented to person, place, and time.      Cranial Nerves: No cranial nerve deficit.      Motor: No tremor, atrophy or abnormal muscle tone.      Coordination: Coordination normal.      Deep Tendon Reflexes: Reflexes normal.      Reflex Scores:       Tricep reflexes are 2+ on the right side and 2+ on the left side.       Bicep reflexes are 2+ on the right side and " 2+ on the left side.       Brachioradialis reflexes are 2+ on the right side and 2+ on the left side.  Psychiatric:         Behavior: Behavior normal.         Thought Content: Thought content normal.         Judgment: Judgment normal.                         Assessment and Plan      Diagnoses and all orders for this visit:    1. Chronic jaw pain (Primary)  Comments:  Initial encounter  Aleve daily, massage affected area  If no improvement, will refer to ENT  Follow-up if symptoms do not improve in 2 weeks  Orders:  -     Comprehensive metabolic panel  -     Lipid panel  -     CBC and Differential  -     TSH    2. Essential (primary) hypertension   Comments:  Well-controlled on current medication  Continue Lisinopril daily, DASH diet, weight loss  Follow-up in 6 months  Orders:  -     Comprehensive metabolic panel  -     Lipid panel  -     CBC and Differential  -     TSH            Follow Up     Return if symptoms worsen or fail to improve.    Patient was given instructions and counseling regarding her condition or for health maintenance advice. Please see specific information pulled into the AVS if appropriate.     -Return if symptoms worsen or do not improve within two weeks.

## 2022-07-20 DIAGNOSIS — E78.49 OTHER HYPERLIPIDEMIA: Primary | ICD-10-CM

## 2022-07-20 LAB
ALBUMIN SERPL-MCNC: 4.5 G/DL (ref 3.8–4.8)
ALBUMIN/GLOB SERPL: 1.9 {RATIO} (ref 1.2–2.2)
ALP SERPL-CCNC: 89 IU/L (ref 44–121)
ALT SERPL-CCNC: 15 IU/L (ref 0–32)
AST SERPL-CCNC: 17 IU/L (ref 0–40)
BASOPHILS # BLD AUTO: 0.1 X10E3/UL (ref 0–0.2)
BASOPHILS NFR BLD AUTO: 1 %
BILIRUB SERPL-MCNC: 0.4 MG/DL (ref 0–1.2)
BUN SERPL-MCNC: 17 MG/DL (ref 8–27)
BUN/CREAT SERPL: 19 (ref 12–28)
CALCIUM SERPL-MCNC: 9.6 MG/DL (ref 8.7–10.3)
CHLORIDE SERPL-SCNC: 104 MMOL/L (ref 96–106)
CHOLEST SERPL-MCNC: 287 MG/DL (ref 100–199)
CO2 SERPL-SCNC: 24 MMOL/L (ref 20–29)
CREAT SERPL-MCNC: 0.91 MG/DL (ref 0.57–1)
EGFRCR SERPLBLD CKD-EPI 2021: 69 ML/MIN/1.73
EOSINOPHIL # BLD AUTO: 0.4 X10E3/UL (ref 0–0.4)
EOSINOPHIL NFR BLD AUTO: 6 %
ERYTHROCYTE [DISTWIDTH] IN BLOOD BY AUTOMATED COUNT: 12.5 % (ref 11.7–15.4)
GLOBULIN SER CALC-MCNC: 2.4 G/DL (ref 1.5–4.5)
GLUCOSE SERPL-MCNC: 96 MG/DL (ref 65–99)
HCT VFR BLD AUTO: 43.9 % (ref 34–46.6)
HDLC SERPL-MCNC: 70 MG/DL
HGB BLD-MCNC: 14.7 G/DL (ref 11.1–15.9)
IMM GRANULOCYTES # BLD AUTO: 0 X10E3/UL (ref 0–0.1)
IMM GRANULOCYTES NFR BLD AUTO: 0 %
LDLC SERPL CALC-MCNC: 196 MG/DL (ref 0–99)
LYMPHOCYTES # BLD AUTO: 2.5 X10E3/UL (ref 0.7–3.1)
LYMPHOCYTES NFR BLD AUTO: 34 %
MCH RBC QN AUTO: 29.6 PG (ref 26.6–33)
MCHC RBC AUTO-ENTMCNC: 33.5 G/DL (ref 31.5–35.7)
MCV RBC AUTO: 89 FL (ref 79–97)
MONOCYTES # BLD AUTO: 0.5 X10E3/UL (ref 0.1–0.9)
MONOCYTES NFR BLD AUTO: 7 %
NEUTROPHILS # BLD AUTO: 3.8 X10E3/UL (ref 1.4–7)
NEUTROPHILS NFR BLD AUTO: 52 %
PLATELET # BLD AUTO: 311 X10E3/UL (ref 150–450)
POTASSIUM SERPL-SCNC: 4.8 MMOL/L (ref 3.5–5.2)
PROT SERPL-MCNC: 6.9 G/DL (ref 6–8.5)
RBC # BLD AUTO: 4.96 X10E6/UL (ref 3.77–5.28)
SODIUM SERPL-SCNC: 140 MMOL/L (ref 134–144)
TRIGL SERPL-MCNC: 122 MG/DL (ref 0–149)
TSH SERPL DL<=0.005 MIU/L-ACNC: 1.23 UIU/ML (ref 0.45–4.5)
VLDLC SERPL CALC-MCNC: 21 MG/DL (ref 5–40)
WBC # BLD AUTO: 7.3 X10E3/UL (ref 3.4–10.8)

## 2022-07-20 RX ORDER — ATORVASTATIN CALCIUM 20 MG/1
20 TABLET, FILM COATED ORAL NIGHTLY
Qty: 90 TABLET | Refills: 1 | Status: SHIPPED | OUTPATIENT
Start: 2022-07-20 | End: 2023-01-16

## 2022-07-21 ENCOUNTER — PATIENT ROUNDING (BHMG ONLY) (OUTPATIENT)
Dept: FAMILY MEDICINE CLINIC | Facility: CLINIC | Age: 67
End: 2022-07-21

## 2022-07-21 NOTE — PROGRESS NOTES
My name is Kay the practice manager.    I want to officially welcome you to our practice, and ask about your recent visit.    If you could tell me about your recent visit with us.  What things went well?    We are always looking for ways to make our patients' experience even better.  Do you have any recommendations on ways we can improve?    Overall were you satisfied with your first visit with our practice?    I appreciate you taking the time to answer a few questions today.        Thank you, and have a great day!

## 2022-10-13 ENCOUNTER — OFFICE VISIT (OUTPATIENT)
Dept: OBSTETRICS AND GYNECOLOGY | Facility: CLINIC | Age: 67
End: 2022-10-13

## 2022-10-13 VITALS
BODY MASS INDEX: 38.26 KG/M2 | WEIGHT: 216 LBS | SYSTOLIC BLOOD PRESSURE: 126 MMHG | DIASTOLIC BLOOD PRESSURE: 80 MMHG | RESPIRATION RATE: 16 BRPM

## 2022-10-13 DIAGNOSIS — D25.1 LEIOMYOMA, INTRAMURAL: ICD-10-CM

## 2022-10-13 DIAGNOSIS — D25.2 SUBSEROSAL LEIOMYOMA OF UTERUS: ICD-10-CM

## 2022-10-13 DIAGNOSIS — Z01.411 ENCOUNTER FOR GYNECOLOGICAL EXAMINATION WITH ABNORMAL FINDING: Primary | ICD-10-CM

## 2022-10-13 DIAGNOSIS — N95.2 VAGINAL ATROPHY: ICD-10-CM

## 2022-10-13 DIAGNOSIS — Z12.11 SCREENING FOR MALIGNANT NEOPLASM OF COLON: ICD-10-CM

## 2022-10-13 DIAGNOSIS — D39.12 NEOPLASM OF UNCERTAIN BEHAVIOR OF LEFT OVARY: ICD-10-CM

## 2022-10-13 DIAGNOSIS — Z12.31 ENCOUNTER FOR SCREENING MAMMOGRAM FOR MALIGNANT NEOPLASM OF BREAST: ICD-10-CM

## 2022-10-13 PROCEDURE — G0101 CA SCREEN;PELVIC/BREAST EXAM: HCPCS | Performed by: NURSE PRACTITIONER

## 2022-10-13 RX ORDER — ESTRADIOL 0.1 MG/G
1 CREAM VAGINAL 2 TIMES WEEKLY
Qty: 42.5 G | Refills: 4 | Status: SHIPPED | OUTPATIENT
Start: 2022-10-13

## 2022-10-13 NOTE — PROGRESS NOTES
Annual Visit     Patient Name: Adore Brito  : 1955   MRN: [id number]   Care Team: Patient Care Team:  Cristal Leos APRN as PCP - General (Family Medicine)  Roxy William APRN as Nurse Practitioner (Nurse Practitioner)    Chief Complaint:    Chief Complaint   Patient presents with   • Annual Exam       HPI: Adore Brito is a 67 y.o. year old  presenting to be seen for her gynecologic exam.   Hx intramural and subserosal fibroids varying from 1cm to 3.5cm in size - 4 in total   Stable left ovarian cyst    done 10/2021 18.9   She denies pelvic pain, pressure, bloating, or wt loss   Has annual pelvic u/s for f/u      Has been doing well with premarin vaginal cream but d/t cost hasn't had it in almost a yr   Would like to discuss restarting it      Mammogram 2021 birads 1      DEXA 2017 WNL   Takes daily calcium and vit d      Colonoscopy - probably 20 yrs ago per pt   Ordered last yr but not done   She is agreeable to having one     Subjective      I have reviewed the patients family history, social history, past medical history, past surgical history and have updated it as appropriate.    /80   Resp 16   Wt 98 kg (216 lb)   LMP  (LMP Unknown)   BMI 38.26 kg/m²     BMI reviewed: Body mass index is 38.26 kg/m².      Objective     Physical Exam    Neuro: alert and oriented to person, place and time   General:  alert; cooperative; well developed; well nourished   Skin:  No suspicious lesions seen   Thyroid: normal to inspection and palpation   Lungs:  breathing is unlabored  clear to auscultation bilaterally   Heart:  regular rate and rhythm, S1, S2 normal, no murmur, click, rub or gallop  normal apical impulse   Breasts:  Examined in supine position  Symmetric without masses or skin dimpling  Nipples normal without inversion, lesions or discharge  There are no palpable axillary nodes   Abdomen: soft, non-tender; no masses  no umbilical or inguinal hernias are present  no  hepato-splenomegaly   Pelvis: Clinical staff was present for exam  External genitalia:  normal appearance of the external genitalia including Bartholin's and [place]'s glands.  :  urethral meatus normal;  Vaginal:  atrophic mucosal changes are present;  Cervix:  normal appearance.  Uterus:  not palpable.  Adnexa:  non palpable bilaterally.  Rectal:  digital rectal exam not performed; anus visually normal appearing.         Assessment / Plan      Assessment  Problems Addressed This Visit    ICD-10-CM ICD-9-CM   1. Encounter for gynecological examination with abnormal finding  Z01.411 V72.31   2. 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 Subserosal leiomyoma of uterus  D25.2 218.2   4. Leiomyoma, intramural  D25.1 218.1   5. Vaginal atrophy  N95.2 627.3   6. Encounter for screening mammogram for malignant neoplasm of breast  Z12.31 V76.12   7. Screening for malignant neoplasm of colon  Z12.11 V76.51       Plan    Pelvic u/s ordered for f/u fibroids and left ovarian cyst   Rpt    Pt states she would like to cont with annual f/u as long as she remains asymptomatic, with u/s and    She will call with changes in appetite, bloating, or weight loss   Will call to discuss u/s and       Script for estradiol vaginal cream given - if generic is too expensive she will let me know and will attempt pt assistance program again with [employer] for Premarin      Reviewed DEXA report   Discussed Calcium, 600 mg/ Vit. D, 400 IU daily; regular weight-bearing exercise     Colonoscopy ordered     Discussed monthly SBEs and importance of annual breast imaging   Mammogram ordered      AV 1 yr             Follow Up  Return in about 1 year (around 10/13/2023) for Annual physical.  Patient was given instructions and counseling regarding her condition or for health maintenance advice. Please see specific information pulled into the AVS if appropriate.     Roxy William, APRN  October 13, 2022  10:55  EDT

## 2022-10-19 ENCOUNTER — LAB (OUTPATIENT)
Dept: LAB | Facility: HOSPITAL | Age: 67
End: 2022-10-19

## 2022-10-19 DIAGNOSIS — D39.12 NEOPLASM OF UNCERTAIN BEHAVIOR OF LEFT OVARY: ICD-10-CM

## 2022-10-19 LAB — CANCER AG125 SERPL QL: 16.6 U/ML (ref 0–38.1)

## 2022-10-19 PROCEDURE — 86304 IMMUNOASSAY TUMOR CA 125: CPT

## 2022-10-23 RX ORDER — PREDNISONE 20 MG/1
40 TABLET ORAL DAILY
Qty: 10 TABLET | Refills: 0 | Status: SHIPPED | OUTPATIENT
Start: 2022-10-23 | End: 2022-11-10

## 2022-10-23 RX ORDER — AZITHROMYCIN 250 MG/1
TABLET, FILM COATED ORAL
Qty: 6 TABLET | Refills: 0 | Status: SHIPPED | OUTPATIENT
Start: 2022-10-23 | End: 2022-11-10

## 2022-10-28 ENCOUNTER — TELEPHONE (OUTPATIENT)
Dept: PULMONOLOGY | Facility: CLINIC | Age: 67
End: 2022-10-28

## 2022-10-28 NOTE — TELEPHONE ENCOUNTER
Pt called today stating that she just finished a round of abx/steroids that was given to her on 10/23/22. Pot states she is still no better and requesting 1 more round of both. Pt does have upcoming apt scheduled for 11/10/22. Pt denies fever/chest pain/nause. Pt c/o fatigue/chest congestion/cough/wheezing.

## 2022-11-10 ENCOUNTER — OFFICE VISIT (OUTPATIENT)
Dept: PULMONOLOGY | Facility: CLINIC | Age: 67
End: 2022-11-10

## 2022-11-10 VITALS
SYSTOLIC BLOOD PRESSURE: 128 MMHG | WEIGHT: 217 LBS | RESPIRATION RATE: 16 BRPM | BODY MASS INDEX: 38.45 KG/M2 | OXYGEN SATURATION: 96 % | TEMPERATURE: 97.8 F | DIASTOLIC BLOOD PRESSURE: 80 MMHG | HEART RATE: 61 BPM | HEIGHT: 63 IN

## 2022-11-10 DIAGNOSIS — K21.9 GASTROESOPHAGEAL REFLUX DISEASE, UNSPECIFIED WHETHER ESOPHAGITIS PRESENT: ICD-10-CM

## 2022-11-10 DIAGNOSIS — J45.20 MILD INTERMITTENT ASTHMA, UNSPECIFIED WHETHER COMPLICATED: Primary | ICD-10-CM

## 2022-11-10 DIAGNOSIS — J30.2 CHRONIC SEASONAL ALLERGIC RHINITIS: ICD-10-CM

## 2022-11-10 PROBLEM — J45.909 ASTHMA: Status: ACTIVE | Noted: 2022-11-10

## 2022-11-10 PROCEDURE — 94726 PLETHYSMOGRAPHY LUNG VOLUMES: CPT | Performed by: NURSE PRACTITIONER

## 2022-11-10 PROCEDURE — 94375 RESPIRATORY FLOW VOLUME LOOP: CPT | Performed by: NURSE PRACTITIONER

## 2022-11-10 PROCEDURE — 94729 DIFFUSING CAPACITY: CPT | Performed by: NURSE PRACTITIONER

## 2022-11-10 PROCEDURE — 99214 OFFICE O/P EST MOD 30 MIN: CPT | Performed by: NURSE PRACTITIONER

## 2022-11-10 RX ORDER — ALBUTEROL SULFATE 90 UG/1
2 AEROSOL, METERED RESPIRATORY (INHALATION) EVERY 6 HOURS PRN
Qty: 18 G | Refills: 6 | Status: SHIPPED | OUTPATIENT
Start: 2022-11-10

## 2022-11-10 NOTE — PROGRESS NOTES
"[facility] Pulmonary Follow up      Chief Complaint  Mild Intermittent Asthma w/out Complication (F/u)    Subjective          Adore Briot presents to [facility]IC[facility] for annual routine follow-up on her asthma.    She does mention around antibiotics and steroids last month for an acute exacerbation.  She is currently doing well.  Has been her only episode this year.    She only uses her Breo occasionally mostly in the fall and spring.  She will use her albuterol rescue inhaler on days where she is working outside.  She does have quite a bit of outdoor seasonal allergens.    She continues on her PPI daily.  She does have a history of chronic reflux.  She has not noticed any difference in her reflux symptoms.  She denies a chronic cough.    Objective     Vital Signs:   /80 (BP Location: Left arm, Patient Position: Sitting, Cuff Size: Adult)   Pulse 61   Temp 97.8 °F (36.6 °C)   Resp 16   Ht 160 cm (62.99\")   Wt 98.4 kg (217 lb)   SpO2 96% Comment: room air at rest  BMI 38.45 kg/m²       Immunization History   Administered Date(s) Administered   • COVID-19 (MODERNA) 1st, 2nd, 3rd Dose Only 03/16/2021, 04/15/2021   • COVID-19 (MODERNA) BIVALENT BOOSTER 18+YRS 10/20/2022   • COVID-19 (MODERNA) BOOSTER 11/18/2021, 05/20/2022   • Flu Vaccine Quad PF >36MO 09/14/2017, 10/15/2018   • Fluzone High-Dose 65+yrs 10/06/2020, 10/15/2021, 10/20/2022   • Fluzone Quad >6mos (Multi-dose) 11/16/2015   • Influenza Quad Vaccine (Inpatient) 09/14/2017   • Pneumococcal Conjugate 13-Valent (PCV13) 10/13/2020   • Pneumococcal Conjugate 20-Valent (PCV20) 05/20/2022   • Tdap 09/14/2017       Physical Exam  Vitals reviewed.   Constitutional:       Appearance: She is well-developed.   HENT:      Head: Normocephalic and atraumatic.   Eyes:      Pupils: Pupils are equal, round, and reactive to light.   Cardiovascular:      Rate and Rhythm: Normal rate and regular rhythm.      Heart sounds: " No murmur heard.  Pulmonary:      Effort: Pulmonary effort is normal. No respiratory distress.      Breath sounds: Normal breath sounds. No wheezing or rales.   Abdominal:      General: Bowel sounds are normal. There is no distension.      Palpations: Abdomen is soft.   Musculoskeletal:         General: Normal range of motion.      Cervical back: Normal range of motion and neck supple.   Skin:     General: Skin is warm and dry.      Findings: No erythema.   Neurological:      Mental Status: She is alert and oriented to person, place, and time.   Psychiatric:         Behavior: Behavior normal.          Result Review :            PFTs done in the office today:  No obstruction or restriction, normal PFTs normal DLCO.  She is actually improved about 10% since 2019.      PA and lateral chest x-ray done the office today reviewed by me  Awaiting final MD interpretation                 Assessment and Plan    Problem List Items Addressed This Visit        Allergies and Adverse Reactions    Chronic seasonal allergic rhinitis       Gastrointestinal Abdominal     GERD (gastroesophageal reflux disease)       Pulmonary and Pneumonias    Asthma - Primary    Relevant Medications    Ventolin  (90 Base) MCG/ACT inhaler    Other Relevant Orders    XR Chest PA & Lateral     We went over her pulmonary function test and her chest x-ray today in the office.  At this time her asthma is well controlled.  She will continue on her Breo as needed and her albuterol HFA as needed with outdoor activity.    Continue on her PPI for her reflux, currently seems well controlled.    She is up-to-date on her vaccines.    Follow-up annually with full PFTs and chest x-ray, as needed with any acute exacerbations or respiratory issues.    Follow Up     No follow-ups on file.  Patient was given instructions and counseling regarding her condition or for health maintenance advice. Please see specific information pulled into the AVS if appropriate.     I  spent 35 minutes caring for Adore on this date of service. This time includes time spent by me in the following activities:preparing for the visit, reviewing tests, obtaining and/or reviewing a separately obtained history, performing a medically appropriate examination and/or evaluation , counseling and educating the patient/family/caregiver, ordering medications, tests, or procedures, referring and communicating with other health care professionals , documenting information in the medical record and independently interpreting results and communicating that information with the patient/family/caregiver    Moderate level of Medical Decision Making complexity based on 2 or more chronic stable illnesses and prescription drug management.    Karli Thomas APRN, ACNP  [facility]  Electronically signed

## 2022-11-14 DIAGNOSIS — N64.4 BREAST PAIN, LEFT: Primary | ICD-10-CM

## 2022-12-29 ENCOUNTER — HOSPITAL ENCOUNTER (OUTPATIENT)
Dept: MAMMOGRAPHY | Facility: HOSPITAL | Age: 67
Discharge: HOME | End: 2022-12-29
Admitting: NURSE PRACTITIONER

## 2022-12-29 DIAGNOSIS — N64.4 BREAST PAIN, LEFT: ICD-10-CM

## 2022-12-29 PROCEDURE — G0279 TOMOSYNTHESIS, MAMMO: HCPCS | Performed by: RADIOLOGY

## 2022-12-29 PROCEDURE — 77066 DX MAMMO INCL CAD BI: CPT

## 2022-12-29 PROCEDURE — 77066 DX MAMMO INCL CAD BI: CPT | Performed by: RADIOLOGY

## 2022-12-29 PROCEDURE — G0279 TOMOSYNTHESIS, MAMMO: HCPCS

## 2022-12-29 RX ORDER — SODIUM, POTASSIUM,MAG SULFATES 17.5-3.13G
SOLUTION, RECONSTITUTED, ORAL ORAL
Qty: 354 ML | Refills: 0 | Status: SHIPPED | OUTPATIENT
Start: 2022-12-29

## 2023-01-03 ENCOUNTER — APPOINTMENT (OUTPATIENT)
Dept: MAMMOGRAPHY | Facility: HOSPITAL | Age: 68
End: 2023-01-03

## 2023-01-13 ENCOUNTER — TELEPHONE (OUTPATIENT)
Dept: OBSTETRICS AND GYNECOLOGY | Facility: CLINIC | Age: 68
End: 2023-01-13
Payer: MEDICARE

## 2023-01-13 NOTE — TELEPHONE ENCOUNTER
Gael with [employer] called regarding pt - they need a new script sent for her premarin  On the prescription they need patients  Name    Address  ap[facility] liscence number and npi number  plz fax to [phone]  If any questions please call [phone]  And you may ask for Gael

## 2023-01-15 DIAGNOSIS — E78.49 OTHER HYPERLIPIDEMIA: ICD-10-CM

## 2023-01-16 RX ORDER — ATORVASTATIN CALCIUM 20 MG/1
TABLET, FILM COATED ORAL
Qty: 30 TABLET | Refills: 0 | Status: SHIPPED | OUTPATIENT
Start: 2023-01-16

## 2023-01-16 NOTE — TELEPHONE ENCOUNTER
Requested Prescriptions     Pending Prescriptions Disp Refills   • atorvastatin (LIPITOR) 20 MG tablet [Pharmacy Med Name: Atorvastatin Calcium 20 MG Oral Tablet] 90 tablet 0     Sig: TAKE 1 TABLET BY MOUTH AT BEDTIME     Sent a 30 day RX. Pt is needing an appointment.     Okay for the HUB to relay message

## 2023-10-25 ENCOUNTER — OFFICE VISIT (OUTPATIENT)
Dept: OBSTETRICS AND GYNECOLOGY | Facility: CLINIC | Age: 68
End: 2023-10-25
Payer: MEDICARE

## 2023-10-25 ENCOUNTER — LAB (OUTPATIENT)
Dept: LAB | Facility: HOSPITAL | Age: 68
End: 2023-10-25
Payer: MEDICARE

## 2023-10-25 VITALS
BODY MASS INDEX: 40.08 KG/M2 | DIASTOLIC BLOOD PRESSURE: 74 MMHG | RESPIRATION RATE: 14 BRPM | WEIGHT: 226.2 LBS | SYSTOLIC BLOOD PRESSURE: 104 MMHG

## 2023-10-25 DIAGNOSIS — D39.9 NEOPLASM OF UNCERTAIN BEHAVIOR OF FEMALE GENITAL ORGAN, UNSPECIFIED: ICD-10-CM

## 2023-10-25 DIAGNOSIS — Z12.11 SCREENING FOR COLON CANCER: ICD-10-CM

## 2023-10-25 DIAGNOSIS — N83.299 COMPLEX OVARIAN CYST: Primary | ICD-10-CM

## 2023-10-25 DIAGNOSIS — N83.299 COMPLEX OVARIAN CYST: ICD-10-CM

## 2023-10-25 LAB — CANCER AG125 SERPL QL: 20.4 U/ML (ref 0–38.1)

## 2023-10-25 PROCEDURE — 86304 IMMUNOASSAY TUMOR CA 125: CPT

## 2023-10-25 RX ORDER — ATORVASTATIN CALCIUM 10 MG/1
10 TABLET, FILM COATED ORAL DAILY
COMMUNITY
Start: 2023-10-17

## 2023-10-25 NOTE — PROGRESS NOTES
Chief Complaint  Adore Brito is a 68 y.o.  female presenting for Gynecologic Exam (Annual, no c/c)    History of Present Illness  Adore is a pleasant 68-year-old woman,  2 para 2-0-0-2, here for GYN exam and follow-up of complex ovarian cyst.  She has no surgical history of any gynecologic procedures.  She does have multiple small uterine fibroids and is stable complex ovarian cyst, which is followed annually.  Ca1 25 in 2022 was 16.6  She has not had labs drawn today yet.  Preliminary pelvic ultrasound from today looks stable.  It has not been read out yet by the doctors.  She has had no vaginal bleeding in the past year.  She had a tracheotomy at the age of 5 due to severe respiratory distress (bronchitis, croup, pneumonia).  She is due for colon cancer screening.  She is willing to do a Cologuard  Mammogram is up-to-date    The following portions of the patient's history were reviewed and updated as appropriate: allergies, current medications, past family history, past medical history, past social history, past surgical history, and problem list.    Allergies   Allergen Reactions    Codeine Nausea And Vomiting         Current Outpatient Medications:     atorvastatin (LIPITOR) 10 MG tablet, Take 1 tablet by mouth Daily., Disp: , Rfl:     estradiol (ESTRACE VAGINAL) 0.1 MG/GM vaginal cream, Insert 1 g into the vagina 2 (Two) Times a Week. (Patient taking differently: Insert 1 g into the vagina 2 (Two) Times a Week. Currently only doing once a week (10/25/2023)), Disp: 42.5 g, Rfl: 4    Fluticasone Furoate-Vilanterol (Breo Ellipta) 100-25 MCG/INH inhaler, Inhale 1 puff Daily., Disp: , Rfl:     lisinopril (PRINIVIL,ZESTRIL) 10 MG tablet, Take 1 tablet by mouth Every Morning., Disp: , Rfl:     omeprazole (priLOSEC) 40 MG capsule, Take 1 capsule by mouth Daily., Disp: 30 capsule, Rfl: 3    temazepam (RESTORIL) 30 MG capsule, Take 1 capsule by mouth As Needed., Disp: , Rfl:     venlafaxine  XR (EFFEXOR-XR) 150 MG 24 hr capsule, Take 1 capsule by mouth Daily., Disp: , Rfl:     Ventolin  (90 Base) MCG/ACT inhaler, Inhale 2 puffs Every 6 (Six) Hours As Needed for Wheezing., Disp: 18 g, Rfl: 6    sodium-potassium-magnesium sulfates (Suprep Bowel Prep Kit) 17.5-3.13-1.6 GM/177ML solution oral solution, Use as directed by provider for colonoscopy prep (Patient not taking: Reported on 10/25/2023), Disp: 354 mL, Rfl: 0    Past Medical History:   Diagnosis Date    Anxiety     Arthritis     Asthma     Depression     Fibroids     intramural, subserosal    GERD (gastroesophageal reflux disease)     Headache     History of panic attacks     Hypertension     Irritable bowel disease     Menopause     Visual impairment         Past Surgical History:   Procedure Laterality Date    TONSILLECTOMY      TRACHEOSTOMY         Objective  /74 (BP Location: Right arm, Patient Position: Sitting, Cuff Size: Adult)   Resp 14   Wt 103 kg (226 lb 3.2 oz)   LMP  (LMP Unknown)   BMI 40.08 kg/m²     Physical Exam  Vitals and nursing note reviewed. Exam conducted with a chaperone present.   Constitutional:       General: She is not in acute distress.     Appearance: Normal appearance. She is not ill-appearing.   HENT:      Head: Normocephalic.   Neck:      Thyroid: No thyroid mass or thyromegaly.   Cardiovascular:      Rate and Rhythm: Normal rate and regular rhythm.      Heart sounds: Normal heart sounds. No murmur heard.  Pulmonary:      Effort: Pulmonary effort is normal. No respiratory distress.      Breath sounds: Normal breath sounds.   Chest:   Breasts:     Right: No inverted nipple, mass or nipple discharge.      Left: No inverted nipple, mass or nipple discharge.   Abdominal:      Palpations: Abdomen is soft. There is no mass.      Tenderness: There is no abdominal tenderness.   Genitourinary:     General: Normal vulva.      Labia:         Right: No rash, tenderness or lesion.         Left: No rash, tenderness  or lesion.       Vagina: Normal. No erythema.      Cervix: No discharge, lesion or erythema.      Uterus: Not enlarged and not tender.       Adnexa:         Right: No mass or tenderness.          Left: No mass or tenderness.        Comments: Anus appears wnl.  No rectal exam performed.  Lymphadenopathy:      Upper Body:      Right upper body: No supraclavicular or axillary adenopathy.      Left upper body: No supraclavicular or axillary adenopathy.   Skin:     General: Skin is warm and dry.   Neurological:      Mental Status: She is alert and oriented to person, place, and time.   Psychiatric:         Mood and Affect: Mood normal.         Behavior: Behavior normal.         Assessment/Plan   Diagnoses and all orders for this visit:    1. Complex ovarian cyst (Primary)  -     ; Future    2. 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 Screening for colon cancer  -     Cologuard - Stool, Per Rectum; Future        Procedures    40 to 64: Counseling/Anticipatory Guidance Discussed: nutrition, physical activity, screenings, and self-breast exam    Return in about 1 year (around 10/25/2024) for Annual physical.    Sinai Holloway, APRN  10/25/2023

## 2023-11-13 ENCOUNTER — OFFICE VISIT (OUTPATIENT)
Dept: PULMONOLOGY | Facility: CLINIC | Age: 68
End: 2023-11-13
Payer: MEDICARE

## 2023-11-13 VITALS
WEIGHT: 230 LBS | HEIGHT: 63 IN | HEART RATE: 73 BPM | TEMPERATURE: 98.2 F | OXYGEN SATURATION: 93 % | SYSTOLIC BLOOD PRESSURE: 124 MMHG | BODY MASS INDEX: 40.75 KG/M2 | DIASTOLIC BLOOD PRESSURE: 72 MMHG

## 2023-11-13 DIAGNOSIS — J30.2 CHRONIC SEASONAL ALLERGIC RHINITIS: ICD-10-CM

## 2023-11-13 DIAGNOSIS — J45.909 ASTHMA, UNSPECIFIED ASTHMA SEVERITY, UNSPECIFIED WHETHER COMPLICATED, UNSPECIFIED WHETHER PERSISTENT: Primary | ICD-10-CM

## 2023-11-13 RX ORDER — ALBUTEROL SULFATE 90 UG/1
2 AEROSOL, METERED RESPIRATORY (INHALATION) EVERY 6 HOURS PRN
Qty: 18 G | Refills: 6 | Status: SHIPPED | OUTPATIENT
Start: 2023-11-13

## 2023-11-13 NOTE — PROGRESS NOTES
"[facility], [place] [facility] Follow up      Chief Complaint  Asthma (Follow Up )    Subjective          Adore Betsy Brito presents to [facility] for routine annual follow-up on her asthma.  She is really had no issues this year.  She not even using her Breo anymore, she rarely uses her albuterol with activity.  She denies any significant shortness of breath, no cough or wheezing.  She had no recent acute exacerbation or respiratory illness.        Objective     Vital Signs:   /72 (BP Location: Left arm, Patient Position: Sitting, Cuff Size: Adult)   Pulse 73   Temp 98.2 °F (36.8 °C) (Infrared)   Ht 159.8 cm (62.9\")   Wt 104 kg (230 lb)   SpO2 93% Comment: room air at rest  BMI 40.87 kg/m²       Immunization History   Administered Date(s) Administered    COVID-19 (MODERNA) 1st,2nd,3rd Dose Monovalent 03/16/2021, 04/15/2021    COVID-19 (MODERNA) BIVALENT 12+YRS 10/20/2022    COVID-19 (MODERNA) Monovalent Original Booster 11/18/2021, 05/20/2022    COVID-19 F23 (MODERNA) 12YRS+ (SPIKEVAX) 10/17/2023    Flu Vaccine Quad PF >36MO 09/14/2017, 10/15/2018    Fluzone High-Dose 65+yrs 10/06/2020, 10/15/2021, 10/20/2022    Fluzone Quad >6mos (Multi-dose) 11/16/2015    Influenza Quad Vaccine (Inpatient) 09/14/2017    Pneumococcal Conjugate 13-Valent (PCV13) 10/13/2020    Pneumococcal Conjugate 20-Valent (PCV20) 05/20/2022    Tdap 09/14/2017       Physical Exam  Vitals reviewed.   Constitutional:       Appearance: She is well-developed.   HENT:      Head: Normocephalic and atraumatic.   Eyes:      Pupils: Pupils are equal, round, and reactive to light.   Cardiovascular:      Rate and Rhythm: Normal rate and regular rhythm.      Heart sounds: No murmur heard.  Pulmonary:      Effort: Pulmonary effort is normal. No respiratory distress.      Breath sounds: Normal breath sounds. No wheezing or rales.   Abdominal:      General: Bowel sounds are normal. There is no distension.     "  Palpations: Abdomen is soft.   Musculoskeletal:         General: Normal range of motion.      Cervical back: Normal range of motion and neck supple.   Skin:     General: Skin is warm and dry.      Findings: No erythema.   Neurological:      Mental Status: She is alert and oriented to person, place, and time.   Psychiatric:         Behavior: Behavior normal.          Result Review :            PFTs done in the office today:  Normal PFTs, no obstruction or restriction FVC is 2.45, 87% predicted no change since last year at 2.50, 87% predicted.      PA and lateral chest x-ray done the office today reviewed by me  Awaiting final MD interpretation                 Assessment and Plan    Problem List Items Addressed This Visit          Allergies and Adverse Reactions    Chronic seasonal allergic rhinitis       Pulmonary and Pneumonias    Asthma - Primary    Relevant Medications    Ventolin  (90 Base) MCG/ACT inhaler    Other Relevant Orders    XR Chest PA & Lateral    Spirometry with Diffusion Capacity & Lung Volumes (Completed)     At this time she is doing really well.  She only uses albuterol occasionally with activity.  She has not had to used her Breo for some time now.    She will continue to follow-up annually, with full PFTs and a chest x-ray.  And as needed with any acute issues.      Follow Up     No follow-ups on file.  Patient was given instructions and counseling regarding her condition or for health maintenance advice. Please see specific information pulled into the AVS if appropriate.     I spent 32 minutes caring for Adore on this date of service. This time includes time spent by me in the following activities:preparing for the visit, reviewing tests, obtaining and/or reviewing a separately obtained history, performing a medically appropriate examination and/or evaluation , counseling and educating the patient/family/caregiver, ordering medications, tests, or procedures, referring and communicating  with other health care professionals , documenting information in the medical record, and independently interpreting results and communicating that information with the patient/family/caregiver    Excluding time spent on other separate services such as performing procedures or test interpretation, if applicable    Moderate level of Medical Decision Making complexity based on 2 or more chronic stable illnesses and prescription drug management.    ROCCO Hilario, ACNP  [facility]  Electronically signed

## 2023-11-21 DIAGNOSIS — R89.9 ABNORMAL LABORATORY TEST: Primary | ICD-10-CM

## 2024-02-01 RX ORDER — SODIUM, POTASSIUM,MAG SULFATES 17.5-3.13G
2 SOLUTION, RECONSTITUTED, ORAL ORAL TAKE AS DIRECTED
Qty: 354 ML | Refills: 0 | Status: SHIPPED | OUTPATIENT
Start: 2024-02-01

## 2024-02-05 ENCOUNTER — TELEPHONE (OUTPATIENT)
Dept: OBSTETRICS AND GYNECOLOGY | Facility: CLINIC | Age: 69
End: 2024-02-05
Payer: MEDICARE

## 2024-02-05 NOTE — TELEPHONE ENCOUNTER
PATIENT WOULD LIKE A CALL BACK TO SPEAK TO [employer] ABOUT PREMRIN MEDICATION.     INSURANCE DOES NOT WANT TO COVER MEDICATION SO THEY WILL BE EMAILING A LINK TO [employer] TO FILL OUT ON THEIR SITE. THE FORM DOES NOT NEED TO BE PRINTED OUT IT SHOULD BE FILLED OUT ONLINE.

## 2024-02-12 ENCOUNTER — OUTSIDE FACILITY SERVICE (OUTPATIENT)
Dept: GASTROENTEROLOGY | Facility: CLINIC | Age: 69
End: 2024-02-12
Payer: MEDICARE

## 2024-02-29 ENCOUNTER — TELEPHONE (OUTPATIENT)
Dept: OBSTETRICS AND GYNECOLOGY | Facility: CLINIC | Age: 69
End: 2024-02-29
Payer: MEDICARE

## 2024-02-29 NOTE — TELEPHONE ENCOUNTER
I have returned the patient's call.  I have told her that forms have been completed and I will fax them today.  Patient gets free Premarin vaginal cream from [facility] and this requires yearly renewal.    The patient will call back if she has any problems getting her medication.

## 2024-02-29 NOTE — TELEPHONE ENCOUNTER
Provider: ROCCO Beckwith    Caller: NIMA VALERO    Relationship to Patient: SELF    Pharmacy:     Phone Number: [phone]    Reason for Call: PFIZER.    When was the patient last seen:     PATIENT CALLING IN ABOUT THE PFIZER. PATIENT WOULD LIKE YOU TO RE FAX THE PATIENT ASSISTANCE FORM .[phone].     PATIENT IS REQUESTING ANNE-MARIE TO TAKE CARE OF THIS IF SHE IS IN THE OFFICE TODAY PLEASE..  PATIENT CAN BE REACHED .[phone]    THANK YOU

## 2024-03-07 ENCOUNTER — TELEPHONE (OUTPATIENT)
Dept: OBSTETRICS AND GYNECOLOGY | Facility: CLINIC | Age: 69
End: 2024-03-07
Payer: MEDICARE

## 2024-03-07 NOTE — TELEPHONE ENCOUNTER
[facility] Ph.# [phone] & fax# [phone]    Pts name, address &  on the RX    FOR: Premarin    Need NPI & State License #(& exp date) on the RX - manually signed and the brand name of the medication.      Please fax and include all information above.

## 2024-04-05 ENCOUNTER — TELEPHONE (OUTPATIENT)
Dept: PULMONOLOGY | Facility: CLINIC | Age: 69
End: 2024-04-05
Payer: MEDICARE

## 2024-04-05 NOTE — TELEPHONE ENCOUNTER
Pt called complaining of SOB, wheezing, chest congestion, unproductive cough, fever of and off. Denies other symptoms, has not taken a covid test. Wants to know if abx/steroid could be called into [facility].

## 2024-04-05 NOTE — TELEPHONE ENCOUNTER
Called pt back and informed, does not wish to go to . Pt states she will stay home. Advised pt to go to the ER if condition worsens and to call Monday to update us.

## 2024-04-06 ENCOUNTER — TELEPHONE (OUTPATIENT)
Dept: PULMONOLOGY | Facility: CLINIC | Age: 69
End: 2024-04-06
Payer: MEDICARE

## 2024-04-06 NOTE — TELEPHONE ENCOUNTER
Patient is seen in our office for asthma.  She called the office yesterday and antibiotics and steroids were phoned to her pharmacy.  She called today and when I returned her call I got voicemail.  I left a message that if she is worse she should go to the emergency room

## 2024-10-29 ENCOUNTER — OFFICE VISIT (OUTPATIENT)
Dept: OBSTETRICS AND GYNECOLOGY | Facility: CLINIC | Age: 69
End: 2024-10-29
Payer: MEDICARE

## 2024-10-29 VITALS
HEIGHT: 63 IN | WEIGHT: 225.6 LBS | BODY MASS INDEX: 39.97 KG/M2 | SYSTOLIC BLOOD PRESSURE: 134 MMHG | DIASTOLIC BLOOD PRESSURE: 80 MMHG

## 2024-10-29 DIAGNOSIS — N95.2 ATROPHY OF VAGINA: ICD-10-CM

## 2024-10-29 DIAGNOSIS — N83.299 COMPLEX OVARIAN CYST: ICD-10-CM

## 2024-10-29 DIAGNOSIS — D39.12 NEOPLASM OF UNCERTAIN BEHAVIOR OF LEFT OVARY: ICD-10-CM

## 2024-10-29 DIAGNOSIS — L29.2 VULVAR ITCHING: ICD-10-CM

## 2024-10-29 DIAGNOSIS — Z01.411 ENCOUNTER FOR GYNECOLOGICAL EXAMINATION WITH ABNORMAL FINDING: Primary | ICD-10-CM

## 2024-10-29 PROCEDURE — G0101 CA SCREEN;PELVIC/BREAST EXAM: HCPCS | Performed by: NURSE PRACTITIONER

## 2024-10-29 PROCEDURE — 3079F DIAST BP 80-89 MM HG: CPT | Performed by: NURSE PRACTITIONER

## 2024-10-29 PROCEDURE — 1160F RVW MEDS BY RX/DR IN RCRD: CPT | Performed by: NURSE PRACTITIONER

## 2024-10-29 PROCEDURE — 1159F MED LIST DOCD IN RCRD: CPT | Performed by: NURSE PRACTITIONER

## 2024-10-29 PROCEDURE — 3075F SYST BP GE 130 - 139MM HG: CPT | Performed by: NURSE PRACTITIONER

## 2024-10-29 RX ORDER — CLOBETASOL PROPIONATE 0.5 MG/G
1 OINTMENT TOPICAL 2 TIMES DAILY
Qty: 30 G | Refills: 3 | Status: SHIPPED | OUTPATIENT
Start: 2024-10-29

## 2024-10-29 NOTE — PROGRESS NOTES
Chief Complaint  Adore Brito is a 69 y.o.  female presenting for Gynecologic Exam (Patient c/o external/vulvar itching.  Using Premarin vaginal cream once/week.  )    History of Present Illness  Adore is a 68yo woman, , here for gyn exam.  She has no past history of any gynecologic surgeries.  Medical history is significant for anxiety and depression, arthritis, asthma, uterine fibroids, GERD, headaches, hx of panic attacks, HTN, IBS.    She is menopausal.  Has had a benign (differential of endometrioma / dermoid) complex cyst on left ovary (stable with US), and she is asymptomatic in regard to any pain or pressure.  But, she does think about it some, and would feel better about it to do imaging once again.  (Would like to do the lab test too.)    She will call to set up mammogram.  (Last in 2022)  Colonoscopy up to date.  (2024, repeat 10 yrs)    The following portions of the patient's history were reviewed and updated as appropriate: allergies, current medications, past family history, past medical history, past social history, past surgical history, and problem list.    Allergies   Allergen Reactions    Codeine Nausea And Vomiting         Current Outpatient Medications:     Estrogens Conjugated (PREMARIN) 0.625 MG/GM vaginal cream, Insert 1 g into the vagina 1 (One) Time Per Week., Disp: 30 g, Rfl: 3    atorvastatin (LIPITOR) 10 MG tablet, Take 1 tablet by mouth Daily., Disp: , Rfl:     clobetasol (TEMOVATE) 0.05 % ointment, Apply 1 Application topically to the appropriate area as directed 2 (Two) Times a Day. Apply BID x 2 wks (AM & HS), then 1x/ day x 2 wks (HS), then 2x/ week (HS), Disp: 30 g, Rfl: 3    Fluticasone Furoate-Vilanterol (Breo Ellipta) 100-25 MCG/INH inhaler, Inhale 1 puff Daily., Disp: , Rfl:     lisinopril (PRINIVIL,ZESTRIL) 10 MG tablet, Take 1 tablet by mouth Every Morning., Disp: , Rfl:     omeprazole (priLOSEC) 40 MG capsule, Take 1 capsule by mouth Daily., Disp:  "30 capsule, Rfl: 3    temazepam (RESTORIL) 30 MG capsule, Take 1 capsule by mouth As Needed., Disp: , Rfl:     venlafaxine XR (EFFEXOR-XR) 150 MG 24 hr capsule, Take 1 capsule by mouth Daily., Disp: , Rfl:     Ventolin  (90 Base) MCG/ACT inhaler, Inhale 2 puffs Every 6 (Six) Hours As Needed for Wheezing., Disp: 18 g, Rfl: 6    Past Medical History:   Diagnosis Date    Anxiety     Arthritis     Asthma     Depression     Fibroids     intramural, subserosal    GERD (gastroesophageal reflux disease)     Headache     History of panic attacks     Hypertension     Irritable bowel disease     Menopause     Visual impairment         Past Surgical History:   Procedure Laterality Date    TONSILLECTOMY      TRACHEOSTOMY         Objective  /80   Ht 160 cm (63\")   Wt 102 kg (225 lb 9.6 oz)   LMP  (LMP Unknown)   Breastfeeding No   BMI 39.96 kg/m²     Physical Exam  Vitals and nursing note reviewed. Exam conducted with a chaperone present.   Constitutional:       General: She is not in acute distress.     Appearance: Normal appearance. She is not ill-appearing.   HENT:      Head: Normocephalic.   Neck:      Thyroid: No thyroid mass or thyromegaly.   Cardiovascular:      Rate and Rhythm: Normal rate and regular rhythm.      Heart sounds: Normal heart sounds. No murmur heard.  Pulmonary:      Effort: Pulmonary effort is normal. No respiratory distress.      Breath sounds: Normal breath sounds.   Chest:   Breasts:     Right: No inverted nipple, mass or nipple discharge.      Left: No inverted nipple, mass or nipple discharge.   Abdominal:      Palpations: Abdomen is soft. There is no mass.      Tenderness: There is no abdominal tenderness.   Genitourinary:     General: Normal vulva.      Labia:         Right: No rash, tenderness or lesion.         Left: No rash, tenderness or lesion.       Vagina: No vaginal discharge or erythema.      Uterus: Absent.       Adnexa:         Right: No mass or tenderness.          " Left: No mass or tenderness.        Comments: No external lesions, but thinning of the tissue / loss of definition of the labia minora.  Vagina is well estrogenized.  Anus appears wnl.  No rectal exam performed.  Lymphadenopathy:      Upper Body:      Right upper body: No supraclavicular or axillary adenopathy.      Left upper body: No supraclavicular or axillary adenopathy.   Skin:     General: Skin is warm and dry.   Neurological:      Mental Status: She is alert and oriented to person, place, and time.   Psychiatric:         Mood and Affect: Mood normal.         Behavior: Behavior normal.         Assessment/Plan   Diagnoses and all orders for this visit:    1. Encounter for gynecological examination with abnormal finding (Primary)    2. Vulvar itching  -     clobetasol (TEMOVATE) 0.05 % ointment; Apply 1 Application topically to the appropriate area as directed 2 (Two) Times a Day. Apply BID x 2 wks (AM & HS), then 1x/ day x 2 wks (HS), then 2x/ week (HS)  Dispense: 30 g; Refill: 3    3. Complex ovarian cyst  -     ; Future  -     US Non-ob Transvaginal; Future    4. Atrophy of vagina  -     Estrogens Conjugated (PREMARIN) 0.625 MG/GM vaginal cream; Insert 1 g into the vagina 1 (One) Time Per Week.  Dispense: 30 g; Refill: 3    5. 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 Miller (FU vulvar itching).    Sinai Holloway, APRN  10/29/2024

## 2024-12-18 DIAGNOSIS — J45.909 ASTHMA, UNSPECIFIED ASTHMA SEVERITY, UNSPECIFIED WHETHER COMPLICATED, UNSPECIFIED WHETHER PERSISTENT: Primary | ICD-10-CM

## 2024-12-18 RX ORDER — ALBUTEROL SULFATE 90 UG/1
2 AEROSOL, METERED RESPIRATORY (INHALATION) EVERY 6 HOURS PRN
Qty: 18 G | Refills: 0 | Status: SHIPPED | OUTPATIENT
Start: 2024-12-18

## 2024-12-18 NOTE — TELEPHONE ENCOUNTER
Pt called requesting refill of her ventolin inhaler to last until her appt with Karli. Approved and sent to [facility]

## 2024-12-18 NOTE — PROGRESS NOTES
"Subjective   Chief Complaint   Patient presents with    Follow-up     Sono today and recheck vulvar itching.  Pt states that the itching is gone.     Adore Brito is a 69 y.o. year old .  No LMP recorded (lmp unknown). Patient is postmenopausal.  She presents for 2 month follow up vulvar itching after initiation of steroid cream. She reports her itching has completely subsided and has no complaints. Denies PMB or pelvic pain. She had her labs drawn this morning prior to her TVUS for follow up left adnexal cyst. She also needs a new referral for a Psychiatrist for medication management.       The following portions of the patient's history were reviewed and updated as appropriate:current medications, allergies, past medical history, and past surgical history    Social History    Tobacco Use      Smoking status: Never      Smokeless tobacco: Never         Objective   /76 (BP Location: Right arm, Patient Position: Sitting, Cuff Size: Large Adult)   Ht 160 cm (62.99\")   Wt 101 kg (223 lb 6.4 oz)   LMP  (LMP Unknown)   Breastfeeding No   BMI 39.58 kg/m²     General:  well developed; well nourished  no acute distress  mentation appropriate  obese - Body mass index is 39.58 kg/m².   Lungs:  breathing is unlabored   Heart:  Not performed.     Lab Review    today, normal     Imaging   Pelvic ultrasound report        Assessment   Vulvar itching, fully resolved   History of left complex adnexal cyst, stable   Possible endometrial polyp   Anxiety/depression     Plan   Patient reports itching has fully resolved, continue to monitor only.   Discussed TVUS with stable appearance of bilateral adnexa, and  normal. Reassurance provided.   However also discussed possible endometrial polyp on TVUS. Patient denies any PMB. Discussed recommendation for SIS for better endometrial characterization, and patient declines.  Patient states she will call in the interim if she has any postmenopausal bleeding, " but would prefer to just have another ultrasound at her next annual exam next year.   Referral placed to behavioral health for anxiety/depression management.   The importance of keeping all planned follow-up and taking all medications as prescribed was emphasized.  Follow up for annual exam 10/2025 or sooner PRN     No orders of the defined types were placed in this encounter.         This note was electronically signed.    Jade Miller MD  December 19, 2024

## 2024-12-19 ENCOUNTER — OFFICE VISIT (OUTPATIENT)
Dept: OBSTETRICS AND GYNECOLOGY | Facility: CLINIC | Age: 69
End: 2024-12-19
Payer: MEDICARE

## 2024-12-19 ENCOUNTER — LAB (OUTPATIENT)
Dept: LAB | Facility: HOSPITAL | Age: 69
End: 2024-12-19
Payer: MEDICARE

## 2024-12-19 ENCOUNTER — TELEPHONE (OUTPATIENT)
Dept: OBSTETRICS AND GYNECOLOGY | Facility: CLINIC | Age: 69
End: 2024-12-19

## 2024-12-19 VITALS
DIASTOLIC BLOOD PRESSURE: 76 MMHG | BODY MASS INDEX: 39.58 KG/M2 | WEIGHT: 223.4 LBS | SYSTOLIC BLOOD PRESSURE: 120 MMHG | HEIGHT: 63 IN

## 2024-12-19 DIAGNOSIS — N83.299 COMPLEX OVARIAN CYST: Primary | ICD-10-CM

## 2024-12-19 DIAGNOSIS — F41.9 ANXIETY: ICD-10-CM

## 2024-12-19 DIAGNOSIS — N83.202 OVARIAN CYST, LEFT: ICD-10-CM

## 2024-12-19 DIAGNOSIS — F32.A DEPRESSION, UNSPECIFIED DEPRESSION TYPE: ICD-10-CM

## 2024-12-19 DIAGNOSIS — R97.8 OTHER ABNORMAL TUMOR MARKERS: ICD-10-CM

## 2024-12-19 DIAGNOSIS — N83.299 COMPLEX OVARIAN CYST: ICD-10-CM

## 2024-12-19 DIAGNOSIS — N95.2 ATROPHY OF VAGINA: Primary | ICD-10-CM

## 2024-12-19 LAB — CANCER AG125 SERPL QL: 17.3 U/ML (ref 0–38.1)

## 2024-12-19 PROCEDURE — 36415 COLL VENOUS BLD VENIPUNCTURE: CPT

## 2024-12-19 PROCEDURE — 86304 IMMUNOASSAY TUMOR CA 125: CPT

## 2024-12-19 NOTE — TELEPHONE ENCOUNTER
Caller: Adore Brito    Relationship: Self    Best call back number: [phone]    Requested Prescriptions:   temazepam (RESTORIL) 30 MG capsule      Pharmacy where request should be sent:    [facility] [id number] - [place] - [address] - [phone]  [phone]   Last office visit with prescribing clinician: 12/19/2024   Last telemedicine visit with prescribing clinician: Visit date not found   Next office visit with prescribing clinician: 11/6/2025     Additional details provided by patient: DR. TANNER RODRIGUES IS NO LONGER WITH [facility] AND PATIENT CAN'T NOT GET HER MEDICATION FILLED AT THE PHARMACY. PATIENT WOULD LIKE TO KNOW IF YOU COULD FILL THIS MEDICATION. PATIENT IS WAITING TO HERE BACK ON THE REFERRAL THAT YOU SENT IN FOR HER TODAY. DR. TANNER RODRIGUES HAS PASSED AWAY.    Does the patient have less than a 3 day supply:  [] Yes  [x] No    Would you like a call back once the refill request has been completed: [x] Yes [] No    If the office needs to give you a call back, can they leave a voicemail: [x] Yes [] No    Surinder Pang Rep   12/19/24 14:33 EST

## 2024-12-23 ENCOUNTER — OFFICE VISIT (OUTPATIENT)
Dept: FAMILY MEDICINE CLINIC | Facility: CLINIC | Age: 69
End: 2024-12-23
Payer: MEDICARE

## 2024-12-23 VITALS
DIASTOLIC BLOOD PRESSURE: 82 MMHG | OXYGEN SATURATION: 100 % | SYSTOLIC BLOOD PRESSURE: 130 MMHG | WEIGHT: 228 LBS | BODY MASS INDEX: 40.4 KG/M2 | HEART RATE: 71 BPM | HEIGHT: 63 IN

## 2024-12-23 DIAGNOSIS — K21.9 GASTROESOPHAGEAL REFLUX DISEASE WITHOUT ESOPHAGITIS: ICD-10-CM

## 2024-12-23 DIAGNOSIS — Z79.899 CONTROLLED SUBSTANCE AGREEMENT SIGNED: ICD-10-CM

## 2024-12-23 DIAGNOSIS — E55.9 VITAMIN D DEFICIENCY: ICD-10-CM

## 2024-12-23 DIAGNOSIS — E53.8 B12 DEFICIENCY: ICD-10-CM

## 2024-12-23 DIAGNOSIS — R73.9 HYPERGLYCEMIA: ICD-10-CM

## 2024-12-23 DIAGNOSIS — I10 PRIMARY HYPERTENSION: ICD-10-CM

## 2024-12-23 DIAGNOSIS — Z13.21 ENCOUNTER FOR VITAMIN DEFICIENCY SCREENING: ICD-10-CM

## 2024-12-23 DIAGNOSIS — F33.42 RECURRENT MAJOR DEPRESSIVE DISORDER, IN FULL REMISSION: Primary | ICD-10-CM

## 2024-12-23 DIAGNOSIS — F41.9 ANXIETY: ICD-10-CM

## 2024-12-23 DIAGNOSIS — F51.01 PRIMARY INSOMNIA: ICD-10-CM

## 2024-12-23 DIAGNOSIS — E78.2 MIXED HYPERLIPIDEMIA: ICD-10-CM

## 2024-12-23 DIAGNOSIS — Z23 ENCOUNTER FOR ADMINISTRATION OF VACCINE: ICD-10-CM

## 2024-12-23 PROBLEM — E66.01 MORBID (SEVERE) OBESITY DUE TO EXCESS CALORIES: Status: ACTIVE | Noted: 2024-12-23

## 2024-12-23 LAB
AMPHET+METHAMPHET UR QL: NEGATIVE
AMPHETAMINE INTERNAL CONTROL: ABNORMAL
AMPHETAMINES UR QL: NEGATIVE
BARBITURATE INTERNAL CONTROL: ABNORMAL
BARBITURATES UR QL SCN: NEGATIVE
BENZODIAZ UR QL SCN: POSITIVE
BENZODIAZEPINE INTERNAL CONTROL: ABNORMAL
BUPRENORPHINE INTERNAL CONTROL: ABNORMAL
BUPRENORPHINE SERPL-MCNC: NEGATIVE NG/ML
CANNABINOIDS SERPL QL: NEGATIVE
COCAINE INTERNAL CONTROL: ABNORMAL
COCAINE UR QL: NEGATIVE
EXPIRATION DATE: ABNORMAL
Lab: ABNORMAL
MDMA (ECSTASY) INTERNAL CONTROL: ABNORMAL
MDMA UR QL SCN: NEGATIVE
METHADONE INTERNAL CONTROL: ABNORMAL
METHADONE UR QL SCN: NEGATIVE
METHAMPHETAMINE INTERNAL CONTROL: ABNORMAL
OPIATES INTERNAL CONTROL: ABNORMAL
OPIATES UR QL: NEGATIVE
OXYCODONE INTERNAL CONTROL: ABNORMAL
OXYCODONE UR QL SCN: NEGATIVE
PCP UR QL SCN: NEGATIVE
PHENCYCLIDINE INTERNAL CONTROL: ABNORMAL
THC INTERNAL CONTROL: ABNORMAL

## 2024-12-23 PROCEDURE — 3075F SYST BP GE 130 - 139MM HG: CPT | Performed by: NURSE PRACTITIONER

## 2024-12-23 PROCEDURE — 1159F MED LIST DOCD IN RCRD: CPT | Performed by: NURSE PRACTITIONER

## 2024-12-23 PROCEDURE — 99214 OFFICE O/P EST MOD 30 MIN: CPT | Performed by: NURSE PRACTITIONER

## 2024-12-23 PROCEDURE — 3079F DIAST BP 80-89 MM HG: CPT | Performed by: NURSE PRACTITIONER

## 2024-12-23 PROCEDURE — 1160F RVW MEDS BY RX/DR IN RCRD: CPT | Performed by: NURSE PRACTITIONER

## 2024-12-23 PROCEDURE — 90662 IIV NO PRSV INCREASED AG IM: CPT | Performed by: NURSE PRACTITIONER

## 2024-12-23 PROCEDURE — G0008 ADMIN INFLUENZA VIRUS VAC: HCPCS | Performed by: NURSE PRACTITIONER

## 2024-12-23 RX ORDER — LISINOPRIL 10 MG/1
10 TABLET ORAL EVERY MORNING
Qty: 90 TABLET | Refills: 1 | Status: SHIPPED | OUTPATIENT
Start: 2024-12-23

## 2024-12-23 RX ORDER — TEMAZEPAM 30 MG/1
30 CAPSULE ORAL NIGHTLY PRN
Qty: 30 CAPSULE | Refills: 0 | Status: SHIPPED | OUTPATIENT
Start: 2024-12-23

## 2024-12-23 RX ORDER — VENLAFAXINE HYDROCHLORIDE 150 MG/1
150 CAPSULE, EXTENDED RELEASE ORAL DAILY
Qty: 90 CAPSULE | Refills: 1 | Status: SHIPPED | OUTPATIENT
Start: 2024-12-23

## 2024-12-23 NOTE — PROGRESS NOTES
Office Note     Name: Adore Brito    : 1955     MRN: [id number]     Chief Complaint  Establish Care (Pt is here to establish care, Pt states she hasn't had a PCP in years ), Depression (Pt ishere due to needing a refill on her depression and anxiety medicine ), and Weight Loss (Pt is wanting to talk about weight loss )    Subjective     History of Present Illness:  Adore Brito is a 69 y.o. female who presents today for establishing care.     History of Present Illness  The patient presents to establish care.    She has been under the care of a psychiatrist for 25 years, who recently passed away. She was unable to refill her medications at [facility] due to his death. She was referred to our office by Dr. Miller's office staff. She has been on Effexor and Restoril for an extended period, which have been effective in managing her symptoms. She is currently out of temazepam. She has been on temazepam for 25 years and has been taking it once nightly. She has been on Effexor for 25 years as well. She has never abused any drugs. She does not believe she requires a new psychiatrist if her current medications can be continued. She has not had any blood work done recently. She has not eaten anything today.    She acknowledges being overweight and has previously lost 30 pounds on the Noom diet, although she has since regained 20 pounds. She is seeking assistance with weight loss.    She is on Breo Ellipta inhaler for asthma and chronic bronchitis and is under the care of a pulmonologist. She is due for a follow-up next month. She experiences bronchitis at least once a year and continues to see her pulmonologist because they prescribe Z-Preet and prednisone when she gets sick. She also uses albuterol.    She is on atorvastatin for cholesterol management and lisinopril for blood pressure management, both of which were initially started by her previous psychiatrist.    Supplemental Information  She had  "a colonoscopy in February 2024 and does not need another one for 10 years. She sees OBGYN within [facility], but she retired, so now she is seeing Dr. Corin Miller. She just saw her on 12/19/2024.    MEDICATIONS  Effexor, Restoril, lisinopril, atorvastatin, Breo Ellipta inhaler, albuterol, omeprazole.      Objective     Past Medical History:   Diagnosis Date    Anxiety     Arthritis     Asthma     Depression     Fibroids     intramural, subserosal    GERD (gastroesophageal reflux disease)     Headache     History of panic attacks     Hypertension     Irritable bowel disease     Menopause     Visual impairment      Past Surgical History:   Procedure Laterality Date    TONSILLECTOMY      TRACHEOSTOMY       Family History   Problem Relation Age of Onset    Hypertension Mother     Deep vein thrombosis Mother     Diabetes Mother     Hypertension Father     Throat cancer Father     Deep vein thrombosis Sister     No Known Problems Sister     No Known Problems Daughter     No Known Problems Son     Hypertension Maternal Grandmother     Hypertension Maternal Grandfather     Stomach cancer Maternal Grandfather     Hypertension Paternal Grandmother     Hypertension Paternal Grandfather     Leukemia Paternal Grandfather     Breast cancer Neg Hx     Ovarian cancer Neg Hx     Osteoporosis Neg Hx        Vital Signs  /82 (BP Location: Left arm, Patient Position: Sitting, Cuff Size: Adult)   Pulse 71   Ht 160 cm (62.99\")   Wt 103 kg (228 lb)   SpO2 100%   BMI 40.40 kg/m²   Estimated body mass index is 40.4 kg/m² as calculated from the following:    Height as of this encounter: 160 cm (62.99\").    Weight as of this encounter: 103 kg (228 lb).    Class 3 Severe Obesity (BMI >=40). Obesity-related health conditions include the following: hypertension, dyslipidemias, and GERD. Obesity is unchanged. BMI is is above average; BMI management plan is completed. We discussed low calorie, low carb based diet program, " portion control, increasing exercise, and Information on healthy weight added to patient's after visit summary.    Physical Exam  Vitals reviewed.   Constitutional:       Appearance: Normal appearance.   Cardiovascular:      Rate and Rhythm: Normal rate and regular rhythm.      Heart sounds: Normal heart sounds.   Pulmonary:      Effort: Pulmonary effort is normal.      Breath sounds: Normal breath sounds.   Skin:     General: Skin is warm and dry.   Neurological:      General: No focal deficit present.      Mental Status: She is alert and oriented to person, place, and time.   Psychiatric:         Mood and Affect: Mood normal.         Behavior: Behavior normal.        Physical Exam       Results         Assessment and Plan     Diagnoses and all orders for this visit:    1. Recurrent major depressive disorder, in full remission (Primary)  -     venlafaxine XR (EFFEXOR-XR) 150 MG 24 hr capsule; Take 1 capsule by mouth Daily.  Dispense: 90 capsule; Refill: 1    2. Anxiety  -     venlafaxine XR (EFFEXOR-XR) 150 MG 24 hr capsule; Take 1 capsule by mouth Daily.  Dispense: 90 capsule; Refill: 1    3. Primary insomnia  -     temazepam (RESTORIL) 30 MG capsule; Take 1 capsule by mouth At Night As Needed for Sleep.  Dispense: 30 capsule; Refill: 0    4. Primary hypertension  -     CBC & Differential  -     Comprehensive Metabolic Panel  -     TSH  -     T4, free  -     lisinopril (PRINIVIL,ZESTRIL) 10 MG tablet; Take 1 tablet by mouth Every Morning.  Dispense: 90 tablet; Refill: 1    5. Mixed hyperlipidemia  -     Lipid Panel    6. Gastroesophageal reflux disease without esophagitis    7. Controlled substance agreement signed  -     POC Urine Drug Screen [facility]    8. Encounter for vitamin deficiency screening  -     Vitamin B12  -     Vitamin D,25-Hydroxy  -     Folate  -     Magnesium    9. Vitamin D deficiency  -     Vitamin D,25-Hydroxy    10. B12 deficiency  -     Vitamin B12    11. Hyperglycemia  -      Comprehensive Metabolic Panel  -     Hemoglobin A1c    12. Encounter for administration of vaccine  -     Fluzone High-Dose 65+yrs ([id number])      Assessment & Plan  1. Anxiety and depression.  Her anxiety and depression are currently in full remission, indicating effective control with her existing medication regimen. A prescription for Effexor (venlafaxine) will be refilled with a note to the pharmacy to hold the prescription as she does not need a refill at this time.    2. Insomnia.  A prescription for temazepam will be refilled. A controlled substance agreement will be signed today. A urine sample will be collected for testing.    3. Blood pressure management.  Her blood pressure is well-regulated. A prescription for lisinopril will be refilled with a note to the pharmacy to hold the prescription as she does not need a refill at this time.    4. Cholesterol management.  A prescription for atorvastatin will be refilled with a note to the pharmacy to hold the prescription as she does not need a refill at this time. The dosage of atorvastatin may be adjusted based on the results of her lab tests.    5. Asthma and chronic bronchitis.  She is currently using the Breo Ellipta inhaler and albuterol as needed. She reports that her lungs sound great, indicating that her current treatment is effective.    6. Weight management.  Laboratory tests will be conducted today to assess her hemoglobin A1c levels. The discussion regarding weight loss will be revisited upon receipt of the lab results.    7. Health maintenance.  She will receive an influenza vaccine today.    Follow-up  The patient will follow up in approximately 1 month for a Medicare wellness visit.    PROCEDURE  Colonoscopy was performed in February 2024.      Follow Up  Return in about 4 weeks (around 1/20/2025) for Medicare Wellness.      Patient or patient representative verbalized consent for the use of Ambient Listening during the visit with  Eloisa PARNELL  ROCCO Llamas for chart documentation. 12/23/2024  09:52 ROCCO Payton

## 2024-12-24 LAB
25(OH)D3+25(OH)D2 SERPL-MCNC: 29 NG/ML (ref 30–100)
ALBUMIN SERPL-MCNC: 4.4 G/DL (ref 3.9–4.9)
ALP SERPL-CCNC: 130 IU/L (ref 44–121)
ALT SERPL-CCNC: 21 IU/L (ref 0–32)
AST SERPL-CCNC: 26 IU/L (ref 0–40)
BASOPHILS # BLD AUTO: 0.1 X10E3/UL (ref 0–0.2)
BASOPHILS NFR BLD AUTO: 1 %
BILIRUB SERPL-MCNC: 0.4 MG/DL (ref 0–1.2)
BUN SERPL-MCNC: 11 MG/DL (ref 8–27)
BUN/CREAT SERPL: 15 (ref 12–28)
CALCIUM SERPL-MCNC: 9.4 MG/DL (ref 8.7–10.3)
CHLORIDE SERPL-SCNC: 103 MMOL/L (ref 96–106)
CHOLEST SERPL-MCNC: 212 MG/DL (ref 100–199)
CO2 SERPL-SCNC: 26 MMOL/L (ref 20–29)
CREAT SERPL-MCNC: 0.72 MG/DL (ref 0.57–1)
EGFRCR SERPLBLD CKD-EPI 2021: 90 ML/MIN/1.73
EOSINOPHIL # BLD AUTO: 0.7 X10E3/UL (ref 0–0.4)
EOSINOPHIL NFR BLD AUTO: 9 %
ERYTHROCYTE [DISTWIDTH] IN BLOOD BY AUTOMATED COUNT: 12.2 % (ref 11.7–15.4)
FOLATE SERPL-MCNC: 16.5 NG/ML
GLOBULIN SER CALC-MCNC: 2.3 G/DL (ref 1.5–4.5)
GLUCOSE SERPL-MCNC: 88 MG/DL (ref 70–99)
HBA1C MFR BLD: 6.2 % (ref 4.8–5.6)
HCT VFR BLD AUTO: 46 % (ref 34–46.6)
HDLC SERPL-MCNC: 79 MG/DL
HGB BLD-MCNC: 14.6 G/DL (ref 11.1–15.9)
IMM GRANULOCYTES # BLD AUTO: 0 X10E3/UL (ref 0–0.1)
IMM GRANULOCYTES NFR BLD AUTO: 0 %
LDLC SERPL CALC-MCNC: 115 MG/DL (ref 0–99)
LYMPHOCYTES # BLD AUTO: 2.4 X10E3/UL (ref 0.7–3.1)
LYMPHOCYTES NFR BLD AUTO: 29 %
MAGNESIUM SERPL-MCNC: 2.3 MG/DL (ref 1.6–2.3)
MCH RBC QN AUTO: 29 PG (ref 26.6–33)
MCHC RBC AUTO-ENTMCNC: 31.7 G/DL (ref 31.5–35.7)
MCV RBC AUTO: 92 FL (ref 79–97)
MONOCYTES # BLD AUTO: 0.5 X10E3/UL (ref 0.1–0.9)
MONOCYTES NFR BLD AUTO: 7 %
NEUTROPHILS # BLD AUTO: 4.4 X10E3/UL (ref 1.4–7)
NEUTROPHILS NFR BLD AUTO: 54 %
PLATELET # BLD AUTO: 363 X10E3/UL (ref 150–450)
POTASSIUM SERPL-SCNC: 5 MMOL/L (ref 3.5–5.2)
PROT SERPL-MCNC: 6.7 G/DL (ref 6–8.5)
RBC # BLD AUTO: 5.03 X10E6/UL (ref 3.77–5.28)
SODIUM SERPL-SCNC: 142 MMOL/L (ref 134–144)
T4 FREE SERPL-MCNC: 1.2 NG/DL (ref 0.82–1.77)
TRIGL SERPL-MCNC: 104 MG/DL (ref 0–149)
TSH SERPL DL<=0.005 MIU/L-ACNC: 1.32 UIU/ML (ref 0.45–4.5)
VIT B12 SERPL-MCNC: 638 PG/ML (ref 232–1245)
VLDLC SERPL CALC-MCNC: 18 MG/DL (ref 5–40)
WBC # BLD AUTO: 8.2 X10E3/UL (ref 3.4–10.8)

## 2024-12-25 DIAGNOSIS — E55.9 VITAMIN D DEFICIENCY: ICD-10-CM

## 2024-12-25 DIAGNOSIS — E78.2 MIXED HYPERLIPIDEMIA: Primary | ICD-10-CM

## 2024-12-25 RX ORDER — MULTIVIT-MIN/IRON/FOLIC ACID/K 18-600-40
2000 CAPSULE ORAL DAILY
Qty: 90 CAPSULE | Refills: 3 | Status: SHIPPED | OUTPATIENT
Start: 2024-12-25

## 2024-12-25 RX ORDER — ATORVASTATIN CALCIUM 10 MG/1
10 TABLET, FILM COATED ORAL DAILY
Qty: 90 TABLET | Refills: 1 | Status: SHIPPED | OUTPATIENT
Start: 2024-12-25

## 2024-12-30 ENCOUNTER — TELEPHONE (OUTPATIENT)
Dept: FAMILY MEDICINE CLINIC | Facility: CLINIC | Age: 69
End: 2024-12-30
Payer: MEDICARE

## 2024-12-30 NOTE — TELEPHONE ENCOUNTER
"LM on VM to call back.    HUB to relay.     ----- Message from Eloisa Muriel sent at 12/30/2024 12:23 PM EST -----    Patient has not seen [employer] result message. Please reach out to them. Thank you!      We got your lab results back.     Your vitamin D is low.  I have sent a supplement to your pharmacy for this.     Your blood counts are normal with the exception of a slightly elevated eosinophil count.  These are the \"allergy\" cell.  This would indicate you have had a recent flare of your allergies.     Your electrolytes, liver, and kidneys all look good.  Your alkaline phosphatase is slightly elevated.  We often see this with liver dysfunction, which is not likely the case for you since your liver enzymes are normal.  We also will see this with bone loss such as osteopenia and osteoporosis.  This is something we will be talking about when you come in in January for the Medicare visit, we will be discussing a DEXA scan to be screening for bone loss.     Your Hemoglobin A1C is 6.2, which is in the prediabetes range. This is not high enough to, technically, warrant starting medications for diabetes, but I would certainly recommend that you watch your intake of high-carb and sugary foods and drinks. Try to limit as much as you can and exercise 3-4 times per week. 6.5 crosses the threshold into the diabetes range.     Your cholesterol levels overall look pretty good.  The cholesterol is slightly elevated  At 115, which is pushing the total cholesterol outside the normal range.  These levels would not warrant an increase in your dose of cholesterol medication, so I will refill your medication at the 10 mg dose.  I will send that prescription to the pharmacy with the note like we did your other prescriptions, so that it will also be in my name whenever you need refills.     Your thyroid, B12, folate, and magnesium levels are all within normal range.  Let me know if you have any questions or concerns.  "

## 2025-01-17 ENCOUNTER — OFFICE VISIT (OUTPATIENT)
Dept: PULMONOLOGY | Facility: CLINIC | Age: 70
End: 2025-01-17
Payer: MEDICARE

## 2025-01-17 VITALS
HEIGHT: 63 IN | WEIGHT: 228 LBS | BODY MASS INDEX: 40.4 KG/M2 | DIASTOLIC BLOOD PRESSURE: 90 MMHG | SYSTOLIC BLOOD PRESSURE: 120 MMHG | HEART RATE: 71 BPM | OXYGEN SATURATION: 97 % | TEMPERATURE: 98.2 F

## 2025-01-17 DIAGNOSIS — J45.909 ASTHMA, UNSPECIFIED ASTHMA SEVERITY, UNSPECIFIED WHETHER COMPLICATED, UNSPECIFIED WHETHER PERSISTENT: Primary | ICD-10-CM

## 2025-01-17 DIAGNOSIS — J30.2 CHRONIC SEASONAL ALLERGIC RHINITIS: ICD-10-CM

## 2025-01-17 RX ORDER — PREDNISONE 10 MG/1
TABLET ORAL
Qty: 31 TABLET | Refills: 0 | Status: SHIPPED | OUTPATIENT
Start: 2025-01-17

## 2025-01-17 RX ORDER — AZITHROMYCIN 250 MG/1
TABLET, FILM COATED ORAL
Qty: 6 TABLET | Refills: 0 | Status: SHIPPED | OUTPATIENT
Start: 2025-01-17

## 2025-01-17 RX ORDER — ALBUTEROL SULFATE 90 UG/1
2 INHALANT RESPIRATORY (INHALATION) EVERY 4 HOURS PRN
Qty: 18 G | Refills: 5 | Status: SHIPPED | OUTPATIENT
Start: 2025-01-17

## 2025-01-17 NOTE — PROGRESS NOTES
"[facility], [place], operated by [facility] Follow up      Chief Complaint  Asthma    Subjective          Adore Betsy Brito presents to [facility] for routine follow-up on her mild intermittent asthma.    She does continue to use her Breo as needed, usually when she just has a bronchitis episode.  She has feels like it helps during that time.  She maybe uses her albuterol during allergy season on occasion.  She denies any cough or sputum production.  No significant chronic chest tightness or wheezing.  She denies any significant shortness of breath with activity.    She has not had any recent acute pulmonary illness.      Objective     Vital Signs:   /90   Pulse 71   Temp 98.2 °F (36.8 °C)   Ht 160 cm (62.99\")   Wt 103 kg (228 lb)   SpO2 97% Comment: room air at rest  BMI 40.40 kg/m²       Immunization History   Administered Date(s) Administered    COVID-19 (MODERNA) 12YRS+ (SPIKEVAX) 10/17/2023    COVID-19 (MODERNA) 1st,2nd,3rd Dose Monovalent 03/16/2021, 04/15/2021    COVID-19 (MODERNA) BIVALENT 12+YRS 10/20/2022    COVID-19 (MODERNA) Monovalent Original Booster 11/18/2021, 05/20/2022    Flu Vaccine Quad PF >36MO 09/14/2017, 10/15/2018    Fluzone  >6mos 09/14/2017    Fluzone High-Dose 65+YRS 12/23/2024    Fluzone High-Dose 65+yrs 10/06/2020, 10/15/2021, 10/20/2022, 10/17/2023    Fluzone Quad >6mos (Multi-dose) 11/16/2015    Pneumococcal Conjugate 13-Valent (PCV13) 10/13/2020    Pneumococcal Conjugate 20-Valent (PCV20) 05/20/2022    Shingrix 10/17/2023    Tdap 09/14/2017       Physical Exam  Vitals reviewed.   Constitutional:       Appearance: She is well-developed.   HENT:      Head: Normocephalic and atraumatic.   Eyes:      Pupils: Pupils are equal, round, and reactive to light.   Cardiovascular:      Rate and Rhythm: Normal rate and regular rhythm.      Heart sounds: No murmur heard.  Pulmonary:      Effort: Pulmonary effort is normal. No respiratory distress.      Breath " sounds: Normal breath sounds. No wheezing or rales.   Abdominal:      General: Bowel sounds are normal. There is no distension.      Palpations: Abdomen is soft.   Musculoskeletal:         General: Normal range of motion.      Cervical back: Normal range of motion and neck supple.   Skin:     General: Skin is warm and dry.      Findings: No erythema.   Neurological:      Mental Status: She is alert and oriented to person, place, and time.   Psychiatric:         Behavior: Behavior normal.          Result Review :            PFTs done in the office today:  No obstruction or restriction normal PFTs      PA and lateral chest x-ray done the office today reviewed by me  Awaiting final MD interpretation                 Assessment and Plan    Problem List Items Addressed This Visit          Allergies and Adverse Reactions    Chronic seasonal allergic rhinitis    Relevant Medications    predniSONE (DELTASONE) 10 MG tablet       Pulmonary and Pneumonias    Asthma - Primary    Relevant Medications    albuterol sulfate  (90 Base) MCG/ACT inhaler    Other Relevant Orders    XR Chest PA & Lateral     We follow Mrs. Brito here in the office for send mild intermittent asthma.  She routinely uses a Breo as needed, unfortunately her insurance no longer pays for her inhaler.  I did go ahead and give her a sample of Trelegy in the office to use if she has any acute exacerbations in the near future.    We did review her PFTs and chest x-ray today in the office no acute findings.    Continue on her albuterol as needed seasonally.  Continue on her antihistamine.    She is up-to-date on her vaccinations, we did discuss RSV vaccine today in the office and she does plan on getting them in the near future.    Continue with annual follow-up with PFTs and chest x-ray and as needed with any acute episodes.    Follow Up     Return in about 1 year (around 1/17/2026).  Patient was given instructions and counseling regarding her condition or  for health maintenance advice. Please see specific information pulled into the AVS if appropriate.     I spent 36 minutes caring for Adore on this date of service. This time includes time spent by me in the following activities:preparing for the visit, reviewing tests, obtaining and/or reviewing a separately obtained history, performing a medically appropriate examination and/or evaluation , counseling and educating the patient/family/caregiver, ordering medications, tests, or procedures, referring and communicating with other health care professionals , documenting information in the medical record, and independently interpreting results and communicating that information with the patient/family/caregiver    Excluding time spent on other separate services such as performing procedures or test interpretation, if applicable    Moderate level of Medical Decision Making complexity based on 2 or more chronic stable illnesses and prescription drug management.    ROCCO Hilario, ACNP  [facility]  Electronically signed

## 2025-01-23 ENCOUNTER — OFFICE VISIT (OUTPATIENT)
Dept: FAMILY MEDICINE CLINIC | Facility: CLINIC | Age: 70
End: 2025-01-23
Payer: MEDICARE

## 2025-01-23 VITALS
DIASTOLIC BLOOD PRESSURE: 78 MMHG | HEART RATE: 68 BPM | OXYGEN SATURATION: 99 % | SYSTOLIC BLOOD PRESSURE: 120 MMHG | WEIGHT: 231 LBS | BODY MASS INDEX: 40.93 KG/M2 | HEIGHT: 63 IN

## 2025-01-23 DIAGNOSIS — E66.01 MORBID (SEVERE) OBESITY DUE TO EXCESS CALORIES: ICD-10-CM

## 2025-01-23 DIAGNOSIS — F51.01 PRIMARY INSOMNIA: ICD-10-CM

## 2025-01-23 DIAGNOSIS — Z12.31 SCREENING MAMMOGRAM FOR BREAST CANCER: ICD-10-CM

## 2025-01-23 DIAGNOSIS — Z00.00 MEDICARE ANNUAL WELLNESS VISIT, SUBSEQUENT: Primary | ICD-10-CM

## 2025-01-23 RX ORDER — ASPIRIN 81 MG/1
81 TABLET ORAL 3 TIMES WEEKLY
COMMUNITY

## 2025-01-23 RX ORDER — SEMAGLUTIDE 0.25 MG/.5ML
0.25 INJECTION, SOLUTION SUBCUTANEOUS WEEKLY
Qty: 2 ML | Refills: 2 | Status: SHIPPED | OUTPATIENT
Start: 2025-01-23

## 2025-01-23 RX ORDER — TEMAZEPAM 30 MG/1
30 CAPSULE ORAL NIGHTLY PRN
Qty: 30 CAPSULE | Refills: 0 | Status: SHIPPED | OUTPATIENT
Start: 2025-01-23

## 2025-01-23 NOTE — PROGRESS NOTES
Subjective   The ABCs of the Annual Wellness Visit  Medicare Wellness Visit      Adore Brito is a 69 y.o. patient who presents for a Medicare Wellness Visit.    The following portions of the patient's history were reviewed and   updated as appropriate: allergies, current medications, past family history, past medical history, past social history, past surgical history, and problem list.    Compared to one year ago, the patient's physical   health is the same.  Compared to one year ago, the patient's mental   health is the same.    Recent Hospitalizations:  She was not admitted to the hospital during the last year.     Current Medical Providers:  Patient Care Team:  Eloisa Llamas APRN as PCP - General (Family Medicine)  Roxy William APRN as Nurse Practitioner (Nurse Practitioner)  Karli Thomas APRN as Nurse Practitioner (Pulmonary Disease)    Outpatient Medications Prior to Visit   Medication Sig Dispense Refill    albuterol sulfate  (90 Base) MCG/ACT inhaler Inhale 2 puffs Every 4 (Four) Hours As Needed for Wheezing. 18 g 5    atorvastatin (LIPITOR) 10 MG tablet Take 1 tablet by mouth Daily. 90 tablet 1    Cholecalciferol (Vitamin D) 50 MCG (2000 UT) capsule Take 1 capsule by mouth Daily. 90 capsule 3    clobetasol (TEMOVATE) 0.05 % ointment Apply 1 Application topically to the appropriate area as directed 2 (Two) Times a Day. Apply BID x 2 wks (AM & HS), then 1x/ day x 2 wks (HS), then 2x/ week (HS) 30 g 3    Estrogens Conjugated (PREMARIN) 0.625 MG/GM vaginal cream Insert 1 g into the vagina 1 (One) Time Per Week. 30 g 3    Fluticasone Furoate-Vilanterol (Breo Ellipta) 100-25 MCG/INH inhaler Inhale 1 puff Daily.      lisinopril (PRINIVIL,ZESTRIL) 10 MG tablet Take 1 tablet by mouth Every Morning. 90 tablet 1    omeprazole (priLOSEC) 40 MG capsule Take 1 capsule by mouth Daily. 30 capsule 3    venlafaxine XR (EFFEXOR-XR) 150 MG 24 hr capsule Take 1 capsule by mouth Daily. 90  "capsule 1    temazepam (RESTORIL) 30 MG capsule Take 1 capsule by mouth At Night As Needed for Sleep. 30 capsule 0    aspirin 81 MG EC tablet Take 1 tablet by mouth 3 (Three) Times a Week.      azithromycin (ZITHROMAX) 250 MG tablet Take 2 by mouth today then 1 daily for 4 days (Patient not taking: Reported on 1/23/2025) 6 tablet 0    predniSONE (DELTASONE) 10 MG tablet Take 4 tabs daily x 3 days, then take 3 tabs daily x 3 days, then take 2 tabs daily x 3 days, then take 1 tab daily x 3 days (Patient not taking: Reported on 1/23/2025) 31 tablet 0     No facility-administered medications prior to visit.     No opioid medication identified on active medication list. I have reviewed chart for other potential  high risk medication/s and harmful drug interactions in the elderly.      Aspirin is not on active medication list.  Aspirin use is indicated based on review of current medical condition/s. Pros and cons of this therapy have been discussed with this patient. Benefits of this medication outweigh potential harm.  Patient has been instructed to start taking this medication..    Patient Active Problem List   Diagnosis    Menopause    Hypertension    History of panic attacks    GERD (gastroesophageal reflux disease)    Depression    Anxiety    Vaginal atrophy    Leiomyoma, intramural    Subserosal leiomyoma of uterus    Ovarian cyst, left    Mild intermittent asthma without complication    Chronic seasonal allergic rhinitis    Fibroids    Morbidly obese    Asthma    Morbid (severe) obesity due to excess calories     Advance Care Planning Advance Directive is on file.  ACP discussion was held with the patient during this visit. Patient has an advance directive in EMR which is still valid.         Objective   Vitals:    01/23/25 0819   BP: 120/78   BP Location: Left arm   Patient Position: Sitting   Pulse: 68   SpO2: 99%   Weight: 105 kg (231 lb)   Height: 160 cm (62.99\")   PainSc: 0-No pain       Estimated body mass " "index is 40.93 kg/m² as calculated from the following:    Height as of this encounter: 160 cm (62.99\").    Weight as of this encounter: 105 kg (231 lb).        Does the patient have evidence of cognitive impairment? No  Lab Results   Component Value Date    CHLPL 212 (H) 2024    TRIG 104 2024    HDL 79 2024     (H) 2024    VLDL 18 2024    HGBA1C 6.2 (H) 2024                                                                                                Health  Risk Assessment    Smoking Status:  Social History     Tobacco Use   Smoking Status Never   Smokeless Tobacco Never     Alcohol Consumption:  Social History     Substance and Sexual Activity   Alcohol Use Not Currently       Fall Risk Screen  STEADI Fall Risk Assessment was completed, and patient is at HIGH risk for falls. Assessment completed on:2025    Depression Screening   Little interest or pleasure in doing things? Not at all   Feeling down, depressed, or hopeless? Not at all   PHQ-2 Total Score 0      Health Habits and Functional and Cognitive Screenin/23/2025     8:17 AM   Functional & Cognitive Status   Do you have difficulty preparing food and eating? No   Do you have difficulty bathing yourself, getting dressed or grooming yourself? No   Do you have difficulty using the toilet? No   Do you have difficulty moving around from place to place? No   Do you have trouble with steps or getting out of a bed or a chair? Yes   Current Diet Well Balanced Diet   Dental Exam Not up to date   Eye Exam Up to date   Exercise (times per week) 0 times per week   Current Exercises Include No Regular Exercise   Do you need help using the phone?  No   Are you deaf or do you have serious difficulty hearing?  No   Do you need help to go to places out of walking distance? No   Do you need help shopping? No   Do you need help preparing meals?  No   Do you need help with housework?  No   Do you need help with laundry? No "   Do you need help taking your medications? No   Do you need help managing money? No   Do you ever drive or ride in a car without wearing a seat belt? No   Have you felt unusual stress, anger or loneliness in the last month? No   Who do you live with? Alone   If you need help, do you have trouble finding someone available to you? No   Have you been bothered in the last four weeks by sexual problems? No   Do you have difficulty concentrating, remembering or making decisions? No           Age-appropriate Screening Schedule:  Refer to the list below for future screening recommendations based on patient's age, sex and/or medical conditions. Orders for these recommended tests are listed in the plan section. The patient has been provided with a written plan.    Health Maintenance List  Health Maintenance   Topic Date Due    HEPATITIS C SCREENING  Never done    DXA SCAN  09/21/2019    ZOSTER VACCINE (2 of 2) 12/12/2023    MAMMOGRAM  12/29/2024    COVID-19 Vaccine (7 - 2024-25 season) 03/14/2025 (Originally 9/1/2024)    PAP SMEAR  01/23/2026 (Originally 9/25/2021)    LIPID PANEL  12/23/2025    ANNUAL WELLNESS VISIT  01/23/2026    BMI FOLLOWUP  01/23/2026    TDAP/TD VACCINES (2 - Td or Tdap) 09/14/2027    COLORECTAL CANCER SCREENING  02/12/2034    INFLUENZA VACCINE  Completed    Pneumococcal Vaccine 65+  Completed                                                                                                                                                CMS Preventative Services Quick Reference  Risk Factors Identified During Encounter  Immunizations Discussed/Encouraged: Shingrix and COVID19    The above risks/problems have been discussed with the patient.  Pertinent information has been shared with the patient in the After Visit Summary.  An After Visit Summary and PPPS were made available to the patient.    Follow Up:   Next Medicare Wellness visit to be scheduled in 1 year.       Chief Complaint  Medicare  "Wellness-subsequent    Subjective    HPI     The patient presents for a Medicare wellness visit.    She reports no significant changes in her physical or mental health over the past year. She has not required hospitalization within this period. She has an updated advanced directive on file. She has expressed interest in receiving the second dose of the shingles vaccine. She has a history of easy bruising, particularly during the summer months when she engages in yard work. She has a history of bronchitis and was prescribed a Z-Preet by her pulmonologist, which she has not yet taken, plans to keep this on hand until needed. She is currently taking over-the-counter vitamin D supplements and a multivitamin containing calcium. She has requested a refill of her temazepam prescription. She has a history of irritable bowel syndrome, which lasted for approximately 6 months following her departure from [facility]. She is on baby aspirin 81 mg 3 times a week.    She has expressed interest in weight loss assistance.    She has a history of restless leg syndrome and is seeking potential treatment options, but does not want anything that will interfere with her Temazepam.       FAMILY HISTORY  She has no family history of breast cancer.    MEDICATIONS  Baby aspirin, temazepam, vitamin D, multivitamin with calcium.    IMMUNIZATIONS  She received her influenza vaccine a couple of days before [place]. She is up to date on her tetanus vaccine, which she received in September 2017. She received the first dose of the shingles vaccine but did not receive the second dose.          Objective   Vital Signs:  /78 (BP Location: Left arm, Patient Position: Sitting)   Pulse 68   Ht 160 cm (62.99\")   Wt 105 kg (231 lb)   SpO2 99%   BMI 40.93 kg/m²     Physical Exam  Vitals reviewed.   Constitutional:       Appearance: Normal appearance.   Cardiovascular:      Rate and Rhythm: Normal rate and regular rhythm.      Heart sounds: Normal " heart sounds. No murmur heard.  Pulmonary:      Effort: Pulmonary effort is normal. No respiratory distress.      Breath sounds: Normal breath sounds. No stridor. No wheezing, rhonchi or rales.   Skin:     General: Skin is warm and dry.   Neurological:      General: No focal deficit present.      Mental Status: She is alert and oriented to person, place, and time.   Psychiatric:         Mood and Affect: Mood normal.         Behavior: Behavior normal.           Lungs were auscultated.    Results  Laboratory Studies  Alkaline phosphatase was 130. Vitamin D level was 29. A1c was 6.2. Serum calcium is normal.    Imaging  Colonoscopy in February 2024 was normal.              Assessment and Plan      1. Medicare wellness visit.  Her last colonoscopy was conducted in February 2024, revealing diverticulosis but otherwise normal results. She is due for a mammogram, having missed the previous year's appointment. Her last mammogram was in December 2022, with no history of abnormal findings. A DEXA scan performed in 2017 yielded normal results. Her most recent Pap smear was conducted in 2018, with no history of abnormal results. Her alkaline phosphatase levels were slightly elevated at 130, while her liver enzymes were within normal limits. Her vitamin D level was marginally below the normal range at 29. Her serum calcium levels were within the normal range. Her A1c level was 6.2, indicating prediabetes. She was advised to receive the second dose of the shingles vaccine at her pharmacy. She was also advised to undergo a mammogram at [facility] in [place]. She declined the option of a DEXA scan. She was advised to continue her vitamin D supplementation at a dosage of 2000 units per day. A prescription for temazepam was provided.    2. Weight management.  She was informed about the potential side effects of Karan, an over-the-counter weight loss medication. A prescription for Wegovy, a weight loss medication, was provided.  She was informed of the potential decline of coverage by her insurance policy and she verbalized understanding. If her insurance does not cover the weight loss medication, she will consider enrolling in a weight management program.    3. Restless leg syndrome.  She was informed about the potential side effects of ropinirole, including dizziness, confusion, and drowsiness, and the possibility of an additive effect when taken with temazepam. She declines this medication for now.     4. Bronchitis.  She has Zithromax prescription provided by her pulmonologist to be used as needed if she develops bronchitis.       Follow-up  The patient is scheduled for a follow-up visit in 3 months.            Follow Up     Return in about 3 months (around 4/23/2025) for Recheck on Temazepam.    Patient was given instructions and counseling regarding her condition or for   health maintenance advice. Please see specific information pulled into the AVS if appropriate.    Patient or patient representative verbalized consent for the use of Ambient Listening during the visit with  ROCCO Parrish for chart documentation. 1/26/2025  12:29 EST

## 2025-02-24 ENCOUNTER — TELEPHONE (OUTPATIENT)
Dept: OBSTETRICS AND GYNECOLOGY | Facility: CLINIC | Age: 70
End: 2025-02-24
Payer: MEDICARE

## 2025-02-24 NOTE — TELEPHONE ENCOUNTER
Pt calling states she has been speaking to Antonia Case to help her get her medication at a much cheaper cost - please call her

## 2025-02-24 NOTE — TELEPHONE ENCOUNTER
I have contacted the patient regarding her request for assistance from [employer].  She gets assistance with Premarin cream.  I have spoken with Antonia VELASQUEZ Regarding this request and have told the patient that we will work on this for her this week.

## 2025-02-25 DIAGNOSIS — F51.01 PRIMARY INSOMNIA: ICD-10-CM

## 2025-02-25 RX ORDER — TEMAZEPAM 30 MG/1
30 CAPSULE ORAL NIGHTLY PRN
Qty: 30 CAPSULE | Refills: 0 | Status: SHIPPED | OUTPATIENT
Start: 2025-02-25

## 2025-02-26 NOTE — TELEPHONE ENCOUNTER
I have contacted Adore by telephone to let her know that all forms have been completed and faxed, along with a written prescription for Premarin vaginal cream.  I have advised the patient to let us know if she needs any further assistance.

## 2025-03-11 ENCOUNTER — TELEPHONE (OUTPATIENT)
Dept: OBSTETRICS AND GYNECOLOGY | Facility: CLINIC | Age: 70
End: 2025-03-11
Payer: MEDICARE

## 2025-03-11 ENCOUNTER — PATIENT MESSAGE (OUTPATIENT)
Dept: FAMILY MEDICINE CLINIC | Facility: CLINIC | Age: 70
End: 2025-03-11
Payer: MEDICARE

## 2025-03-11 NOTE — TELEPHONE ENCOUNTER
Patient is having difficulty getting Premarin vaginal cream from the free [employer] program.  She calls today requesting compounded estradiol cream from [facility] ([phone]).      Is this OK?  If so, I can call the pharmacy for this patient, or if you prefer, you can do so.  She has been given the information regarding pharmacy name, address, and telephone number.    Thanks!

## 2025-03-12 LAB
NCCN CRITERIA FLAG: NORMAL
TYRER CUZICK SCORE: 3.1

## 2025-03-12 RX ORDER — ESTRADIOL 0.1 MG/G
CREAM VAGINAL
Qty: 42.5 G | Refills: 2 | Status: SHIPPED | OUTPATIENT
Start: 2025-03-12

## 2025-03-25 ENCOUNTER — HOSPITAL ENCOUNTER (OUTPATIENT)
Dept: MAMMOGRAPHY | Facility: HOSPITAL | Age: 70
Discharge: HOME | End: 2025-03-25
Admitting: NURSE PRACTITIONER
Payer: MEDICARE

## 2025-03-25 DIAGNOSIS — Z12.31 SCREENING MAMMOGRAM FOR BREAST CANCER: ICD-10-CM

## 2025-03-25 PROCEDURE — 77063 BREAST TOMOSYNTHESIS BI: CPT

## 2025-03-25 PROCEDURE — 77067 SCR MAMMO BI INCL CAD: CPT

## 2025-04-07 DIAGNOSIS — F51.01 PRIMARY INSOMNIA: ICD-10-CM

## 2025-04-07 RX ORDER — TEMAZEPAM 30 MG/1
CAPSULE ORAL
Qty: 30 CAPSULE | Refills: 0 | Status: SHIPPED | OUTPATIENT
Start: 2025-04-07

## 2025-04-23 ENCOUNTER — OFFICE VISIT (OUTPATIENT)
Dept: FAMILY MEDICINE CLINIC | Facility: CLINIC | Age: 70
End: 2025-04-23
Payer: MEDICARE

## 2025-04-23 VITALS
HEART RATE: 72 BPM | OXYGEN SATURATION: 96 % | DIASTOLIC BLOOD PRESSURE: 74 MMHG | HEIGHT: 63 IN | WEIGHT: 236.5 LBS | SYSTOLIC BLOOD PRESSURE: 112 MMHG | BODY MASS INDEX: 41.9 KG/M2

## 2025-04-23 DIAGNOSIS — Z83.3 FAMILY HISTORY OF DIABETES MELLITUS (DM): ICD-10-CM

## 2025-04-23 DIAGNOSIS — R73.03 PREDIABETES: ICD-10-CM

## 2025-04-23 DIAGNOSIS — F51.01 PRIMARY INSOMNIA: Primary | ICD-10-CM

## 2025-04-23 DIAGNOSIS — E66.01 MORBID (SEVERE) OBESITY DUE TO EXCESS CALORIES: ICD-10-CM

## 2025-04-23 RX ORDER — TEMAZEPAM 30 MG/1
30 CAPSULE ORAL NIGHTLY PRN
Qty: 30 CAPSULE | Refills: 0 | Status: SHIPPED | OUTPATIENT
Start: 2025-04-23

## 2025-04-23 NOTE — PROGRESS NOTES
Office Note     Name: Adore Brito    : 1955     MRN: [id number]     Chief Complaint  Follow-up (Med check/)    Subjective     History of Present Illness:  Adore Brito is a 70 y.o. female who presents today for 3-month follow-up on Temazepam. She also questions PA status of Wegovy prescription from 2025.     History of Present Illness  The patient presents for a medication follow-up.    The chief complaint involves issues with obtaining Wegovy, which was prescribed previously. She reports that the pharmacy, [facility], has not received the necessary form from her insurance company, despite the pharmacy's claim that it was sent. The patient seeks assistance in resolving this matter. Additionally, she mentions a family history of diabetes and notes that she is prediabetic, with an A1c of 6.2 in 2025.    She recently refilled her atorvastatin prescription and has a 3-month supply remaining. The lisinopril prescription was picked up in 2025, and she anticipates needing a refill around the end of 2025. The venlafaxine prescription was refilled at the beginning of 2025.    She is in need of a refill of her Temazepam that she takes for insomnia. She has been on this for a long time and continues to do well with no side effects to report.     FAMILY HISTORY  The patient reports that everybody in her family is diabetic.      Objective     Past Medical History:   Diagnosis Date    Anemia     not since menopause    Anxiety     Arthritis     Asthma     Chronic bronchitis     COPD (chronic obstructive pulmonary disease)     Depression     Fibroid abt ?    Fibroids     intramural, subserosal    GERD (gastroesophageal reflux disease)     Headache     History of panic attacks     Hyperlipidemia     Hypertension     Irritable bowel disease     Menopause     Migraine life-long    not so bad since menopause    Obesity     Ovarian cyst ongoing    PMS (premenstrual syndrome)     no longer  "a problem    Pneumonia 1960    not at present    Urinary tract infection abt 1988    Varicella childhood    Visual impairment      Past Surgical History:   Procedure Laterality Date    ADENOIDECTOMY  in childhood    EYE SURGERY  2001    lasix    TONSILLECTOMY      TRACHEOSTOMY      WISDOM TOOTH EXTRACTION  abt 1998     Family History   Problem Relation Age of Onset    Hypertension Mother     Deep vein thrombosis Mother     Diabetes Mother     Hypertension Father     Throat cancer Father     Cancer Father     Deep vein thrombosis Sister     No Known Problems Sister     No Known Problems Daughter     No Known Problems Son     Hypertension Maternal Grandmother     Hypertension Maternal Grandfather     Stomach cancer Maternal Grandfather     Hypertension Paternal Grandmother     Hypertension Paternal Grandfather     Leukemia Paternal Grandfather     Deep vein thrombosis Sister     Breast cancer Neg Hx     Ovarian cancer Neg Hx     Osteoporosis Neg Hx        Vital Signs  /74 (BP Location: Left arm, Patient Position: Sitting, Cuff Size: Large Adult)   Pulse 72   Ht 160 cm (63\")   Wt 107 kg (236 lb 8 oz)   SpO2 96%   BMI 41.89 kg/m²   Estimated body mass index is 41.89 kg/m² as calculated from the following:    Height as of this encounter: 160 cm (63\").    Weight as of this encounter: 107 kg (236 lb 8 oz).    Physical Exam  Vitals reviewed.   Constitutional:       Appearance: Normal appearance.   Cardiovascular:      Rate and Rhythm: Normal rate and regular rhythm.      Heart sounds: Normal heart sounds.   Pulmonary:      Effort: Pulmonary effort is normal.      Breath sounds: Normal breath sounds.   Skin:     General: Skin is warm and dry.   Neurological:      General: No focal deficit present.      Mental Status: She is alert and oriented to person, place, and time.   Psychiatric:         Mood and Affect: Mood normal.         Behavior: Behavior normal.        Physical Exam      Results  Labs   - A1c: 01/2025, " 6.2       Assessment and Plan     Diagnoses and all orders for this visit:    1. Primary insomnia (Primary)  -     temazepam (RESTORIL) 30 MG capsule; Take 1 capsule by mouth At Night As Needed for Sleep.  Dispense: 30 capsule; Refill: 0    2. Morbid (severe) obesity due to excess calories    3. Prediabetes    4. Family history of diabetes mellitus (DM)      Assessment & Plan  1. Prediabetes.  - Her A1c level was recorded as 6.2 in January 2025.  - The insurance company will be contacted to resolve the issue with the prior authorization for Wegovy.  - Once approved, she can receive the first injection in the office and subsequent injections can be administered at home if she feels comfortable with self-administration    2. Medication Management.  - She is currently on atorvastatin, lisinopril, and venlafaxine.  - She picked up atorvastatin and lisinopril recently and has a sufficient supply. She picked up venlafaxine at the beginning of March 2025.  - She is advised to inform the office if she runs out of any medications before the next visit in July 2025.  - A prescription for temazepam has been sent to [facility] pharmacy.    Follow-up  The patient will follow up in 3 months.      Follow Up  Return in about 3 months (around 7/23/2025) for Recheck on Temazepam.      Patient or patient representative verbalized consent for the use of Ambient Listening during the visit with  ROCCO Parrish for chart documentation. 4/23/2025  08:20 EDT    ROCCO Parrish

## 2025-06-06 ENCOUNTER — E-VISIT (OUTPATIENT)
Dept: ADMINISTRATIVE | Facility: OTHER | Age: 70
End: 2025-06-06
Payer: MEDICARE

## 2025-06-06 ENCOUNTER — TELEPHONE (OUTPATIENT)
Dept: FAMILY MEDICINE CLINIC | Facility: CLINIC | Age: 70
End: 2025-06-06
Payer: MEDICARE

## 2025-06-06 NOTE — TELEPHONE ENCOUNTER
Called Pt. Pt states that she had flipped a lawnmower and the steering wheel landed on her leg. Pt stated was bruised and swollen. She had been keeping her leg elevated and taking Naproxen. Pt stated that bruising and swelling has gone down a lot. I encouraged her that if she didn't feel like it was getting better to make an Appt. With PCP to have it evaluated

## 2025-06-06 NOTE — TELEPHONE ENCOUNTER
Caller: Adore Brito    Relationship: Self    Best call back number:   Telephone Information:   Mobile [phone]     What was the call regarding: PATIENT CALLED IN TO REQUEST A CALL BACK ABOUT AN ACCIDENT THAT SHE HAD WHERE A  FELL ON HER ON 05/30/25. PATIENT STATES SHE THINGS IT IS GETTING BETTER BUT WOULD LIKE A CALL FROM THE OFFICE TO CONFIRM SHE IS DOING EVERYTHING SHE CAN FOR IT.

## 2025-06-06 NOTE — E-VISIT ESCALATED
Status: Referred Out  Date: 2025 09:29:17  Acuity Level: Not applicable  Referral message: Based on the information you provided during your interview, eVisit is not appropriate for treating your condition.  Patient: Adore Brito  Patient : 1955  Patient Address: [address] [place] [address]  Patient Phone: [phone]  Clinician Response: Unavailable  Diagnosis: Unavailable  Diagnosis ICD: Unavailable     Patient Interview Questions and Responses: None available

## 2025-06-07 DIAGNOSIS — F51.01 PRIMARY INSOMNIA: ICD-10-CM

## 2025-06-09 DIAGNOSIS — F51.01 PRIMARY INSOMNIA: ICD-10-CM

## 2025-06-09 RX ORDER — TEMAZEPAM 30 MG/1
30 CAPSULE ORAL NIGHTLY PRN
Qty: 30 CAPSULE | Refills: 0 | Status: SHIPPED | OUTPATIENT
Start: 2025-06-09

## 2025-06-09 RX ORDER — TEMAZEPAM 30 MG/1
CAPSULE ORAL
Qty: 30 CAPSULE | OUTPATIENT
Start: 2025-06-09

## 2025-06-09 NOTE — TELEPHONE ENCOUNTER
Caller: Adore Brito    Relationship: Self    Best call back number: [phone]     Requested Prescriptions:   Requested Prescriptions     Pending Prescriptions Disp Refills    temazepam (RESTORIL) 30 MG capsule 30 capsule 0     Sig: Take 1 capsule by mouth At Night As Needed for Sleep.        Pharmacy where request should be sent: [facility] [id number] [address] - [phone]  - [phone]     Last office visit with prescribing clinician: 4/23/2025   Last telemedicine visit with prescribing clinician: Visit date not found   Next office visit with prescribing clinician: 7/23/2025

## 2025-07-02 ENCOUNTER — OFFICE VISIT (OUTPATIENT)
Dept: FAMILY MEDICINE CLINIC | Facility: CLINIC | Age: 70
End: 2025-07-02
Payer: MEDICARE

## 2025-07-02 VITALS
WEIGHT: 235 LBS | BODY MASS INDEX: 41.64 KG/M2 | HEIGHT: 63 IN | HEART RATE: 89 BPM | OXYGEN SATURATION: 95 % | DIASTOLIC BLOOD PRESSURE: 72 MMHG | SYSTOLIC BLOOD PRESSURE: 112 MMHG

## 2025-07-02 DIAGNOSIS — T14.8XXA HEMATOMA: ICD-10-CM

## 2025-07-02 DIAGNOSIS — E78.2 MIXED HYPERLIPIDEMIA: ICD-10-CM

## 2025-07-02 DIAGNOSIS — E55.9 VITAMIN D DEFICIENCY: ICD-10-CM

## 2025-07-02 DIAGNOSIS — R73.03 PREDIABETES: ICD-10-CM

## 2025-07-02 DIAGNOSIS — F51.01 PRIMARY INSOMNIA: ICD-10-CM

## 2025-07-02 DIAGNOSIS — S89.92XA LEFT LEG INJURY, INITIAL ENCOUNTER: Primary | ICD-10-CM

## 2025-07-02 RX ORDER — TEMAZEPAM 30 MG/1
30 CAPSULE ORAL NIGHTLY PRN
Qty: 30 CAPSULE | Refills: 0 | Status: SHIPPED | OUTPATIENT
Start: 2025-07-02

## 2025-07-02 NOTE — PROGRESS NOTES
Office Note     Name: Adore Brito    : 1955     MRN: [id number]     Chief Complaint  Leg Pain (Pt states has some leg pain on her left leg where a  fell on here)    Subjective     History of Present Illness:  Adore Brito is a 70 y.o. female who presents today for left leg injury about a month ago, has a residual firm area on her anterior shin.     History of Present Illness  The patient presents for evaluation of a left leg injury and weight management.    Approximately one month ago, she experienced an accident with her riding mower, resulting in significant swelling and bruising on her left leg. The incident occurred when she backed up the mower into a tree root, causing it to flip over onto her. Although she managed to disengage the blade, she was unable to prevent herself from falling. Initially, there was no sensation in the affected area, where the steering wheel struck her left shin, but this has since returned. She did not seek immediate medical attention or undergo any x-rays as she does not believe there is a fracture. The area remains hard and painful upon palpation, with slight soreness behind the knee but no discomfort in the ankle. She has been using a cane for support. During the past month, she discontinued her aspirin regimen due to the injury. She has been managing the injury by keeping her leg elevated and applying ice, which she believes has aided in the healing process.     She is seeking advice on weight loss medications, as her insurance did not approve Wegovy. Her daughter suggested exploring other GLP-1 options that might be covered by her insurance. She is fasting today.    She is scheduled for a 3-month checkup in two weeks for her Temazepam and would like to address it during today's visit to avoid returning later.       Objective     Past Medical History:   Diagnosis Date    Anemia     not since menopause    Anxiety     Arthritis     Asthma     Chronic  "bronchitis 1960    COPD (chronic obstructive pulmonary disease)     Depression     Fibroid abt 2007?    Fibroids     intramural, subserosal    GERD (gastroesophageal reflux disease)     Headache     History of panic attacks     Hyperlipidemia     Hypertension     Irritable bowel disease     Menopause     Migraine life-long    not so bad since menopause    Obesity     Ovarian cyst ongoing    PMS (premenstrual syndrome)     no longer a problem    Pneumonia 1960    not at present    Urinary tract infection abt 1988    Varicella childhood    Visual impairment      Past Surgical History:   Procedure Laterality Date    ADENOIDECTOMY  in childhood    EYE SURGERY  2001    lasix    TONSILLECTOMY      TRACHEOSTOMY      WISDOM TOOTH EXTRACTION  abt 1998     Family History   Problem Relation Age of Onset    Hypertension Mother     Deep vein thrombosis Mother     Diabetes Mother     Hypertension Father     Throat cancer Father     Cancer Father     Deep vein thrombosis Sister     No Known Problems Sister     No Known Problems Daughter     No Known Problems Son     Hypertension Maternal Grandmother     Hypertension Maternal Grandfather     Stomach cancer Maternal Grandfather     Hypertension Paternal Grandmother     Hypertension Paternal Grandfather     Leukemia Paternal Grandfather     Deep vein thrombosis Sister     Breast cancer Neg Hx     Ovarian cancer Neg Hx     Osteoporosis Neg Hx        Vital Signs  /72 (BP Location: Left arm)   Pulse 89   Ht 160 cm (63\")   Wt 107 kg (235 lb)   SpO2 95%   BMI 41.63 kg/m²   Estimated body mass index is 41.63 kg/m² as calculated from the following:    Height as of this encounter: 160 cm (63\").    Weight as of this encounter: 107 kg (235 lb).    Physical Exam  Vitals reviewed.   Constitutional:       Appearance: Normal appearance.   Cardiovascular:      Rate and Rhythm: Normal rate and regular rhythm.      Heart sounds: Normal heart sounds.   Pulmonary:      Effort: Pulmonary " effort is normal.      Breath sounds: Normal breath sounds.   Musculoskeletal:      Left lower leg: Swelling (and discoloration noted; firm, well-circumscribed hematoma noted to medial side of anterior lower left leg) and tenderness (to palpation of anterior lower left leg in its entirety, also mild popliteal tenderness but no redness/swelling/ecchymosis in the area noted) present.        Legs:    Skin:     General: Skin is warm and dry.   Neurological:      General: No focal deficit present.      Mental Status: She is alert and oriented to person, place, and time.   Psychiatric:         Mood and Affect: Mood normal.         Behavior: Behavior normal.        Physical Exam  Extremities: Hematoma noted on the left leg. Discoloration present. Tenderness upon palpation behind the knee and lower leg.    Results  Labs   - A1c: 12/2024, 6.2       Assessment and Plan     Diagnoses and all orders for this visit:    1. Left leg injury, initial encounter (Primary)    2. Hematoma    3. Primary insomnia  -     temazepam (RESTORIL) 30 MG capsule; Take 1 capsule by mouth At Night As Needed for Sleep.  Dispense: 30 capsule; Refill: 0    4. Prediabetes  -     CBC & Differential  -     Comprehensive Metabolic Panel  -     Hemoglobin A1c    5. Mixed hyperlipidemia  -     Lipid Panel    6. Vitamin D deficiency  -     Vitamin D,25-Hydroxy      Assessment & Plan  1. Left leg injury.  - Sustained a left leg injury from a riding mower accident about a month ago, resulting in a hematoma.  - Hematoma is firm and well-defined, indicating the body's natural healing process; no evidence of a fracture as evidenced by lack of pain with ambulation. .  - Advised to apply a heating pad to the affected area to help dissolve the hematoma and promote blood reabsorption; adequate hydration emphasized.  - Recommended use of compression stockings to manage swelling, with instructions to apply them in the morning and remove them at night; advised to resume  aspirin therapy; instructed to seek immediate medical attention if symptoms such as redness or tightness in the calf area develop.    2. Weight management.  - A1c level was 6.2 in December 2024, placing her in the prediabetes range.  - Medicare only covers Ozempic and Mounjaro for diabetes management.  - Blood work will be conducted today to reassess her A1c level.  - If A1c has increased above T2DM threshold, initiating treatment with either Ozempic or Mounjaro will be considered.    3. Medication management.  - Requested a refill for temazepam.  - Prescription for temazepam will be refilled and sent to pharmacy.  - Regular checkup scheduled for 07/23/2025 will be combined with today's visit to avoid an additional appointment.      Follow Up  Return in about 3 months (around 10/2/2025) for Recheck on Temazepam.      Patient or patient representative verbalized consent for the use of Ambient Listening during the visit with  ROCCO Parrish for chart documentation. 7/4/2025  08:39 EDT    ROCCO Parrish

## 2025-07-03 LAB
25(OH)D3+25(OH)D2 SERPL-MCNC: 74.7 NG/ML (ref 30–100)
ALBUMIN SERPL-MCNC: 4.2 G/DL (ref 3.9–4.9)
ALP SERPL-CCNC: 129 IU/L (ref 44–121)
ALT SERPL-CCNC: 23 IU/L (ref 0–32)
AST SERPL-CCNC: 25 IU/L (ref 0–40)
BASOPHILS # BLD AUTO: 0.1 X10E3/UL (ref 0–0.2)
BASOPHILS NFR BLD AUTO: 1 %
BILIRUB SERPL-MCNC: 0.4 MG/DL (ref 0–1.2)
BUN SERPL-MCNC: 19 MG/DL (ref 8–27)
BUN/CREAT SERPL: 19 (ref 12–28)
CALCIUM SERPL-MCNC: 9.8 MG/DL (ref 8.7–10.3)
CHLORIDE SERPL-SCNC: 102 MMOL/L (ref 96–106)
CHOLEST SERPL-MCNC: 191 MG/DL (ref 100–199)
CO2 SERPL-SCNC: 24 MMOL/L (ref 20–29)
CREAT SERPL-MCNC: 0.98 MG/DL (ref 0.57–1)
EGFRCR SERPLBLD CKD-EPI 2021: 62 ML/MIN/1.73
EOSINOPHIL # BLD AUTO: 0.6 X10E3/UL (ref 0–0.4)
EOSINOPHIL NFR BLD AUTO: 9 %
ERYTHROCYTE [DISTWIDTH] IN BLOOD BY AUTOMATED COUNT: 12.5 % (ref 11.7–15.4)
GLOBULIN SER CALC-MCNC: 2.3 G/DL (ref 1.5–4.5)
GLUCOSE SERPL-MCNC: 121 MG/DL (ref 70–99)
HBA1C MFR BLD: 6 % (ref 4.8–5.6)
HCT VFR BLD AUTO: 43 % (ref 34–46.6)
HDLC SERPL-MCNC: 68 MG/DL
HGB BLD-MCNC: 14 G/DL (ref 11.1–15.9)
IMM GRANULOCYTES # BLD AUTO: 0 X10E3/UL (ref 0–0.1)
IMM GRANULOCYTES NFR BLD AUTO: 0 %
LDLC SERPL CALC-MCNC: 104 MG/DL (ref 0–99)
LYMPHOCYTES # BLD AUTO: 2.6 X10E3/UL (ref 0.7–3.1)
LYMPHOCYTES NFR BLD AUTO: 35 %
MCH RBC QN AUTO: 30 PG (ref 26.6–33)
MCHC RBC AUTO-ENTMCNC: 32.6 G/DL (ref 31.5–35.7)
MCV RBC AUTO: 92 FL (ref 79–97)
MONOCYTES # BLD AUTO: 0.7 X10E3/UL (ref 0.1–0.9)
MONOCYTES NFR BLD AUTO: 9 %
NEUTROPHILS # BLD AUTO: 3.5 X10E3/UL (ref 1.4–7)
NEUTROPHILS NFR BLD AUTO: 46 %
PLATELET # BLD AUTO: 342 X10E3/UL (ref 150–450)
POTASSIUM SERPL-SCNC: 4.8 MMOL/L (ref 3.5–5.2)
PROT SERPL-MCNC: 6.5 G/DL (ref 6–8.5)
RBC # BLD AUTO: 4.67 X10E6/UL (ref 3.77–5.28)
SODIUM SERPL-SCNC: 141 MMOL/L (ref 134–144)
TRIGL SERPL-MCNC: 108 MG/DL (ref 0–149)
VLDLC SERPL CALC-MCNC: 19 MG/DL (ref 5–40)
WBC # BLD AUTO: 7.4 X10E3/UL (ref 3.4–10.8)

## 2025-07-09 DIAGNOSIS — E78.2 MIXED HYPERLIPIDEMIA: ICD-10-CM

## 2025-07-10 RX ORDER — ATORVASTATIN CALCIUM 10 MG/1
10 TABLET, FILM COATED ORAL DAILY
Qty: 90 TABLET | Refills: 0 | Status: SHIPPED | OUTPATIENT
Start: 2025-07-10

## 2025-08-05 DIAGNOSIS — F51.01 PRIMARY INSOMNIA: ICD-10-CM

## 2025-08-06 RX ORDER — TEMAZEPAM 30 MG/1
CAPSULE ORAL
Qty: 30 CAPSULE | Refills: 0 | Status: SHIPPED | OUTPATIENT
Start: 2025-08-06